# Patient Record
Sex: MALE | Race: WHITE | NOT HISPANIC OR LATINO
[De-identification: names, ages, dates, MRNs, and addresses within clinical notes are randomized per-mention and may not be internally consistent; named-entity substitution may affect disease eponyms.]

---

## 2019-03-28 ENCOUNTER — APPOINTMENT (OUTPATIENT)
Dept: ORTHOPEDIC SURGERY | Facility: CLINIC | Age: 64
End: 2019-03-28
Payer: COMMERCIAL

## 2019-03-28 VITALS
DIASTOLIC BLOOD PRESSURE: 90 MMHG | HEIGHT: 72 IN | BODY MASS INDEX: 29.12 KG/M2 | WEIGHT: 215 LBS | SYSTOLIC BLOOD PRESSURE: 138 MMHG | HEART RATE: 93 BPM

## 2019-03-28 DIAGNOSIS — Z78.9 OTHER SPECIFIED HEALTH STATUS: ICD-10-CM

## 2019-03-28 DIAGNOSIS — Z80.9 FAMILY HISTORY OF MALIGNANT NEOPLASM, UNSPECIFIED: ICD-10-CM

## 2019-03-28 DIAGNOSIS — F41.9 ANXIETY DISORDER, UNSPECIFIED: ICD-10-CM

## 2019-03-28 DIAGNOSIS — Z86.39 PERSONAL HISTORY OF OTHER ENDOCRINE, NUTRITIONAL AND METABOLIC DISEASE: ICD-10-CM

## 2019-03-28 DIAGNOSIS — M47.812 SPONDYLOSIS W/OUT MYELOPATHY OR RADICULOPATHY, CERVICAL REGION: ICD-10-CM

## 2019-03-28 DIAGNOSIS — Z86.718 PERSONAL HISTORY OF OTHER VENOUS THROMBOSIS AND EMBOLISM: ICD-10-CM

## 2019-03-28 DIAGNOSIS — Z86.79 PERSONAL HISTORY OF OTHER DISEASES OF THE CIRCULATORY SYSTEM: ICD-10-CM

## 2019-03-28 PROCEDURE — 99204 OFFICE O/P NEW MOD 45 MIN: CPT

## 2019-03-28 PROCEDURE — 72040 X-RAY EXAM NECK SPINE 2-3 VW: CPT

## 2019-03-28 NOTE — DISCUSSION/SUMMARY
[de-identified] : I have recommended that the pt continue with a conservative treatment plan. Patient is not a candidate for anti-inflammatory medications given his cardiac history. We did discuss cutting down on his steroid dose secondary to cardiac irritability. He is to continue with Valium, as well as soft tissue modalities. He does not want physical modalities, such as PT, as this time. He will be contacted in 1 week to make sure he is progressing well.

## 2019-03-28 NOTE — PHYSICAL EXAM
[de-identified] : CONSTITUTIONAL:  Patient is a very pleasant individual who is well-nourished and appears stated age. \par PSYCHIATRIC:  Alert and oriented times three and in no apparent distress, and participates with orthopedic evaluation well.\par HEAD:  Atraumatic and  nonsyndromic in appearance.\par EENT: No thyromegaly, EOMI.\par RESPIRATORY:  Respiratory rate is regular, not dyspneic on examination.\par LYMPHATICS:  There is no cervical or axillary lymphadenopathy.\par INTEGUMENTARY:  Skin is clean, dry, and intact about the bilateral upper extremities and cervical spine. \par VASCULAR:   There is brisk capillary refill about the bilateral upper extremities and radial pulses are 2/4. \par NEUROLOGIC:  Negative L'hirmitte, negative Spurling’s sign. There are no pathologic reflexes. There is no decrease in sensation of the bilateral upper extremities on Wartenberg pinwheel examination.  Deep tendon reflexes are well-maintained at +2/4 of the bilateral upper extremities and are symmetric.\par MUSCULOSKELETAL:  There is no visible muscular atrophy.  Manual motor strength is well maintained in the bilateral upper extremities.  Cervical range of motion is well maintained. The patient ambulates in a non-myelopathic manner. Normal secondary orthopaedic exam of bilateral shoulders, elbows and hands.  Elbow flexion and extension, wrist extension, finger flexion and abduction are well maintained.\par  \par isolated right trapezial myositis, a well as greater occipital neuritis [de-identified] : Xray of the cervical spine taken today shows shows age appropriate spondylosis

## 2019-03-28 NOTE — HISTORY OF PRESENT ILLNESS
[de-identified] : 63 year old male presents for acute onset cervical spine pain. He states on 3 days ago he fell asleep on a couch, and woke with significant neck pain. Patient states his neck pain has been getting progressively worse since then. He states his pain is sharp in nature with associated neck stiffness. Patient reports no radicular signs and symptoms. He states his pain was mildly improved with use of Valium and topical anti-inflammatory medications. Patient has an extensive cardiac history of including Afib, HTN, HLD, and blood clots, and is currently on Xarelto. He began a Medrol Dose Pack yesterday, and states he became tachycardic. Patient would like to discuss his symptoms at this time.  MAI questionnaire is negative.  [Ataxia] : no ataxia [Incontinence] : no incontinence [Loss of Dexterity] : good dexterity [Urinary Ret.] : no urinary retention

## 2019-03-28 NOTE — ADDENDUM
[FreeTextEntry1] : Documented by Asmita Miller acting as a scribe for Dr. Ned Rico on 03/28/2019. All medical record entries made by the Scribe were at my, Dr. Ned Rico, direction and personally dictated by me on 03/28/2019. I have reviewed the chart and agree that the record accurately reflects my personal performance of the history, physical exam, assessment and plan. I have also personally directed, reviewed, and agreed with the chart.

## 2021-04-23 ENCOUNTER — EMERGENCY (EMERGENCY)
Facility: HOSPITAL | Age: 66
LOS: 1 days | Discharge: HOME | End: 2021-04-23
Attending: EMERGENCY MEDICINE
Payer: COMMERCIAL

## 2021-04-23 ENCOUNTER — TRANSCRIPTION ENCOUNTER (OUTPATIENT)
Age: 66
End: 2021-04-23

## 2021-04-23 VITALS
TEMPERATURE: 99 F | HEART RATE: 74 BPM | RESPIRATION RATE: 18 BRPM | OXYGEN SATURATION: 97 % | DIASTOLIC BLOOD PRESSURE: 97 MMHG | WEIGHT: 205.03 LBS | HEIGHT: 72 IN | SYSTOLIC BLOOD PRESSURE: 139 MMHG

## 2021-04-23 VITALS
OXYGEN SATURATION: 97 % | DIASTOLIC BLOOD PRESSURE: 88 MMHG | TEMPERATURE: 98 F | SYSTOLIC BLOOD PRESSURE: 125 MMHG | HEART RATE: 62 BPM | RESPIRATION RATE: 18 BRPM

## 2021-04-23 DIAGNOSIS — Z98.89 OTHER SPECIFIED POSTPROCEDURAL STATES: Chronic | ICD-10-CM

## 2021-04-23 LAB
ALBUMIN SERPL ELPH-MCNC: 4.4 G/DL — SIGNIFICANT CHANGE UP (ref 3.3–5.2)
ALP SERPL-CCNC: 93 U/L — SIGNIFICANT CHANGE UP (ref 40–120)
ALT FLD-CCNC: 26 U/L — SIGNIFICANT CHANGE UP
ANION GAP SERPL CALC-SCNC: 10 MMOL/L — SIGNIFICANT CHANGE UP (ref 5–17)
APTT BLD: 40.5 SEC — HIGH (ref 27.5–35.5)
AST SERPL-CCNC: 24 U/L — SIGNIFICANT CHANGE UP
BASOPHILS # BLD AUTO: 0.04 K/UL — SIGNIFICANT CHANGE UP (ref 0–0.2)
BASOPHILS NFR BLD AUTO: 0.3 % — SIGNIFICANT CHANGE UP (ref 0–2)
BILIRUB SERPL-MCNC: 0.6 MG/DL — SIGNIFICANT CHANGE UP (ref 0.4–2)
BLD GP AB SCN SERPL QL: SIGNIFICANT CHANGE UP
BUN SERPL-MCNC: 12 MG/DL — SIGNIFICANT CHANGE UP (ref 8–20)
CALCIUM SERPL-MCNC: 9.5 MG/DL — SIGNIFICANT CHANGE UP (ref 8.6–10.2)
CHLORIDE SERPL-SCNC: 107 MMOL/L — SIGNIFICANT CHANGE UP (ref 98–107)
CO2 SERPL-SCNC: 27 MMOL/L — SIGNIFICANT CHANGE UP (ref 22–29)
CREAT SERPL-MCNC: 1 MG/DL — SIGNIFICANT CHANGE UP (ref 0.5–1.3)
EOSINOPHIL # BLD AUTO: 0.14 K/UL — SIGNIFICANT CHANGE UP (ref 0–0.5)
EOSINOPHIL NFR BLD AUTO: 1.2 % — SIGNIFICANT CHANGE UP (ref 0–6)
GLUCOSE SERPL-MCNC: 96 MG/DL — SIGNIFICANT CHANGE UP (ref 70–99)
HCT VFR BLD CALC: 43.1 % — SIGNIFICANT CHANGE UP (ref 39–50)
HCT VFR BLD CALC: 43.4 % — SIGNIFICANT CHANGE UP (ref 39–50)
HGB BLD-MCNC: 14.5 G/DL — SIGNIFICANT CHANGE UP (ref 13–17)
HGB BLD-MCNC: 14.8 G/DL — SIGNIFICANT CHANGE UP (ref 13–17)
IMM GRANULOCYTES NFR BLD AUTO: 0.3 % — SIGNIFICANT CHANGE UP (ref 0–1.5)
INR BLD: 1.29 RATIO — HIGH (ref 0.88–1.16)
LACTATE BLDV-MCNC: 1.4 MMOL/L — SIGNIFICANT CHANGE UP (ref 0.5–2)
LYMPHOCYTES # BLD AUTO: 2.73 K/UL — SIGNIFICANT CHANGE UP (ref 1–3.3)
LYMPHOCYTES # BLD AUTO: 23 % — SIGNIFICANT CHANGE UP (ref 13–44)
MCHC RBC-ENTMCNC: 29.8 PG — SIGNIFICANT CHANGE UP (ref 27–34)
MCHC RBC-ENTMCNC: 30.2 PG — SIGNIFICANT CHANGE UP (ref 27–34)
MCHC RBC-ENTMCNC: 33.4 GM/DL — SIGNIFICANT CHANGE UP (ref 32–36)
MCHC RBC-ENTMCNC: 34.3 GM/DL — SIGNIFICANT CHANGE UP (ref 32–36)
MCV RBC AUTO: 88 FL — SIGNIFICANT CHANGE UP (ref 80–100)
MCV RBC AUTO: 89.1 FL — SIGNIFICANT CHANGE UP (ref 80–100)
MONOCYTES # BLD AUTO: 0.69 K/UL — SIGNIFICANT CHANGE UP (ref 0–0.9)
MONOCYTES NFR BLD AUTO: 5.8 % — SIGNIFICANT CHANGE UP (ref 2–14)
NEUTROPHILS # BLD AUTO: 8.24 K/UL — HIGH (ref 1.8–7.4)
NEUTROPHILS NFR BLD AUTO: 69.4 % — SIGNIFICANT CHANGE UP (ref 43–77)
PLATELET # BLD AUTO: 235 K/UL — SIGNIFICANT CHANGE UP (ref 150–400)
PLATELET # BLD AUTO: 245 K/UL — SIGNIFICANT CHANGE UP (ref 150–400)
POTASSIUM SERPL-MCNC: 4 MMOL/L — SIGNIFICANT CHANGE UP (ref 3.5–5.3)
POTASSIUM SERPL-SCNC: 4 MMOL/L — SIGNIFICANT CHANGE UP (ref 3.5–5.3)
PROT SERPL-MCNC: 7.3 G/DL — SIGNIFICANT CHANGE UP (ref 6.6–8.7)
PROTHROM AB SERPL-ACNC: 14.8 SEC — HIGH (ref 10.6–13.6)
RBC # BLD: 4.87 M/UL — SIGNIFICANT CHANGE UP (ref 4.2–5.8)
RBC # BLD: 4.9 M/UL — SIGNIFICANT CHANGE UP (ref 4.2–5.8)
RBC # FLD: 13.2 % — SIGNIFICANT CHANGE UP (ref 10.3–14.5)
RBC # FLD: 13.3 % — SIGNIFICANT CHANGE UP (ref 10.3–14.5)
SODIUM SERPL-SCNC: 144 MMOL/L — SIGNIFICANT CHANGE UP (ref 135–145)
WBC # BLD: 11.8 K/UL — HIGH (ref 3.8–10.5)
WBC # BLD: 11.88 K/UL — HIGH (ref 3.8–10.5)
WBC # FLD AUTO: 11.8 K/UL — HIGH (ref 3.8–10.5)
WBC # FLD AUTO: 11.88 K/UL — HIGH (ref 3.8–10.5)

## 2021-04-23 PROCEDURE — 85610 PROTHROMBIN TIME: CPT

## 2021-04-23 PROCEDURE — 86900 BLOOD TYPING SEROLOGIC ABO: CPT

## 2021-04-23 PROCEDURE — 96374 THER/PROPH/DIAG INJ IV PUSH: CPT | Mod: XU

## 2021-04-23 PROCEDURE — 85025 COMPLETE CBC W/AUTO DIFF WBC: CPT

## 2021-04-23 PROCEDURE — 80053 COMPREHEN METABOLIC PANEL: CPT

## 2021-04-23 PROCEDURE — 85730 THROMBOPLASTIN TIME PARTIAL: CPT

## 2021-04-23 PROCEDURE — 85027 COMPLETE CBC AUTOMATED: CPT

## 2021-04-23 PROCEDURE — 93010 ELECTROCARDIOGRAM REPORT: CPT

## 2021-04-23 PROCEDURE — G1004: CPT

## 2021-04-23 PROCEDURE — 93005 ELECTROCARDIOGRAM TRACING: CPT

## 2021-04-23 PROCEDURE — 82272 OCCULT BLD FECES 1-3 TESTS: CPT

## 2021-04-23 PROCEDURE — 86901 BLOOD TYPING SEROLOGIC RH(D): CPT

## 2021-04-23 PROCEDURE — 74177 CT ABD & PELVIS W/CONTRAST: CPT | Mod: 26,MG

## 2021-04-23 PROCEDURE — 96375 TX/PRO/DX INJ NEW DRUG ADDON: CPT

## 2021-04-23 PROCEDURE — 70450 CT HEAD/BRAIN W/O DYE: CPT | Mod: 26,MG

## 2021-04-23 PROCEDURE — 99284 EMERGENCY DEPT VISIT MOD MDM: CPT | Mod: 25

## 2021-04-23 PROCEDURE — 99285 EMERGENCY DEPT VISIT HI MDM: CPT

## 2021-04-23 PROCEDURE — 83605 ASSAY OF LACTIC ACID: CPT

## 2021-04-23 PROCEDURE — 36415 COLL VENOUS BLD VENIPUNCTURE: CPT

## 2021-04-23 PROCEDURE — 86850 RBC ANTIBODY SCREEN: CPT

## 2021-04-23 PROCEDURE — 70450 CT HEAD/BRAIN W/O DYE: CPT

## 2021-04-23 PROCEDURE — 74177 CT ABD & PELVIS W/CONTRAST: CPT

## 2021-04-23 RX ORDER — METOCLOPRAMIDE HCL 10 MG
10 TABLET ORAL ONCE
Refills: 0 | Status: COMPLETED | OUTPATIENT
Start: 2021-04-23 | End: 2021-04-23

## 2021-04-23 RX ORDER — PANTOPRAZOLE SODIUM 20 MG/1
40 TABLET, DELAYED RELEASE ORAL ONCE
Refills: 0 | Status: COMPLETED | OUTPATIENT
Start: 2021-04-23 | End: 2021-04-23

## 2021-04-23 RX ORDER — MECLIZINE HCL 12.5 MG
25 TABLET ORAL ONCE
Refills: 0 | Status: COMPLETED | OUTPATIENT
Start: 2021-04-23 | End: 2021-04-23

## 2021-04-23 RX ADMIN — Medication 25 MILLIGRAM(S): at 19:03

## 2021-04-23 RX ADMIN — Medication 10 MILLIGRAM(S): at 15:37

## 2021-04-23 RX ADMIN — PANTOPRAZOLE SODIUM 40 MILLIGRAM(S): 20 TABLET, DELAYED RELEASE ORAL at 15:35

## 2021-04-23 RX ADMIN — Medication 25 MILLIGRAM(S): at 15:38

## 2021-04-23 NOTE — ED ADULT NURSE NOTE - NSIMPLEMENTINTERV_GEN_ALL_ED
Implemented All Universal Safety Interventions:  [place] to call system. Call bell, personal items and telephone within reach. Instruct patient to call for assistance. Room bathroom lighting operational. Non-slip footwear when patient is off stretcher. Physically safe environment: no spills, clutter or unnecessary equipment. Stretcher in lowest position, wheels locked, appropriate side rails in place.

## 2021-04-23 NOTE — ED ADULT TRIAGE NOTE - CHIEF COMPLAINT QUOTE
c/o nausea vomiting, having dark brown vomit, on Xarelto, denies abdominal pain  sent by MD for GI bleed, denies noticing rectal bleeding or dark stool  last month 3 episodes, had episode last night

## 2021-04-23 NOTE — ED ADULT NURSE REASSESSMENT NOTE - NS ED NURSE REASSESS COMMENT FT1
Received report from MONTRELL Goff. CASSANDRA noted, respirations even and nonlabored. No complaints at this time.

## 2021-04-23 NOTE — ED PROVIDER NOTE - NSFOLLOWUPINSTRUCTIONS_ED_ALL_ED_FT
-Your hemoglobin was normal on 2 blood tests which were done 6 hours apart   -You have some cysts on your kidney that should be further evaluated with a nephrologist  -It is also important that you follow up with a GI physician -Your hemoglobin was normal on 2 blood tests which were done 6 hours apart   -You have some cysts on your kidney that should be further evaluated with a nephrologist. Please see a nephrologist to have an Ultrasound of your kidneys.   -It is also important that you follow up with a GI physician to discuss the vomiting you experienced   -Please return if you have any new, worse, or concerning symptoms      Hematemesis    WHAT YOU NEED TO KNOW:    Hematemesis is the vomiting of blood. This is caused by bleeding in your upper gastrointestinal (GI) system. The blood may be bright red, or it may look like coffee grounds. Hematemesis is a medical emergency that needs immediate treatment. You may need to stay in the emergency department for up to 12 hours after treatment. You may then be sent home, or you may be admitted to the hospital for more treatment. If you are sent home, it is important for you to follow up with your healthcare provider as directed.    DISCHARGE INSTRUCTIONS:    Call 911 for any of the following:   •You have signs of shock from blood loss, such as the following: ?Feeling dizzy or faint, or breathing faster than usual      ?Pale, cool, clammy skin  ?A fast pulse, large pupils, or feeling anxious or agitated  ?Nausea or weakness    Return to the emergency department if:   •You are vomiting large amounts of blood, or you vomit several times in a row.  •You have severe pain in your abdomen.     Contact your healthcare provider if:   •You have new or worsening symptoms.   •You have questions or concerns about your condition or care.    Medicines: You may need any of the following:   •Medicine may be given to reduce the amount of acid your stomach produces. This may help if your hematemesis is caused by an ulcer.     •Take your medicine as directed. Contact your healthcare provider if you think your medicine is not helping or if you have side effects. Tell him of her if you are allergic to any medicine. Keep a list of the medicines, vitamins, and herbs you take. Include the amounts, and when and why you take them. Bring the list or the pill bottles to follow-up visits. Carry your medicine list with you in case of an emergency.    Manage your symptoms:   •Do not take NSAIDs or aspirin. These medicines can cause stomach bleeding. Talk to your healthcare provider about other medicines that are safe for you to take.    •Do not smoke. Nicotine can damage blood vessels. Talk to your healthcare provider if you need help quitting. E-cigarettes or smokeless tobacco still contain nicotine. Ask your healthcare provider for information before you use these products.     •Do not drink alcohol or caffeine. Alcohol and caffeine can irritate your stomach. The lining of your stomach or intestine may also be damaged. Talk to your healthcare provider if you need help to quit drinking alcohol.    •Eat a variety of healthy foods. Healthy foods include fruits, vegetables, low-fat dairy products, lean meats, fish, and legumes such as lentils. Healthy foods can help you heal and improve your energy.    •Drink extra liquids as directed. You may need to drink extra liquids to prevent dehydration from vomiting. Ask your healthcare provider how much liquid to drink each day and which liquids are best for you.    Follow up with your healthcare provider as directed: Write down your questions so you remember to ask them during your visits.

## 2021-04-23 NOTE — ED PROVIDER NOTE - NS ED ROS FT
Constitutional: no fever, no chills  Head: NC, AT   Eyes: no redness   ENMT: no nasal congestion/drainage, no sore throat   CV: no chest pain, no edema  Resp: no cough, no dyspnea  GI: no abdominal pain, +nausea, +bloody vomiting, no diarrhea  : no dysuria, no hematuria   Skin: no lesions, no rashes   Neuro: +dizziness, no LOC, no headache, no sensory deficits, no weakness

## 2021-04-23 NOTE — ED PROVIDER NOTE - PATIENT PORTAL LINK FT
You can access the FollowMyHealth Patient Portal offered by [facility] by registering at the following website: [email or web address]. By joining [facility]’s FollowMyHealth portal, you will also be able to view your health information using other applications (apps) compatible with our system.

## 2021-04-23 NOTE — ED PROVIDER NOTE - ATTENDING CONTRIBUTION TO CARE
I, Kodi Montes, performed a face to face bedside interview with this patient regarding history of present illness, review of symptoms and relevant past medical, social and family history.  I completed an independent physical examination. I have communicated the patient’s plan of care and disposition with the resident.  65 year old male with PMH afib on xarelto presents with dizziness and vomiting. Pt states that he has had several weeks of intermittent room spinning dizziness that is worsened with positional changes. He reports that today he had 2 episodes of vomiting, one of which was reddish-brown colored, no galina blood. He denies melena, black or bloody stools, abd pain, chest pain, blurry vision.  Gen: NAD, well appearing  CV: RRR  Pul: CTA b/l  Abd: Soft, non-distended, non-tender  Neuro: MS 5/5 b/l UE and LE, no dysmetria on finger to nose, no focal sensory deficits  Pt improved, states dizziness is better. He has occult blood negative, which if he had upper gi bleed we would suspect to be positive. No episodes of emesis entire time here and rpt hgb completely stable, unlikely to have upper GI bleed. stable for dc

## 2021-04-23 NOTE — ED PROVIDER NOTE - CARE PROVIDER_API CALL
Axel Norman)  Gastroenterology; Internal Medicine  [address], [address]  [place] [address]  Phone: [phone]  Fax: [phone]  Follow Up Time:     Don Pardo)  Internal Medicine; Nephrology  [address], [address]  [place], [place] [id number]  Phone: [phone]  Fax: [phone]  Follow Up Time:

## 2021-04-23 NOTE — ED PROVIDER NOTE - OBJECTIVE STATEMENT
65 year old male with history of afib (on xarelto, s/p ablations, has loop recorder), who presents with nausea. Over the last 65 year old male with history of afib (on xarelto, s/p ablations, has loop recorder) and RVH who presents with nausea. Last night the patient had an emesis of hematemesis. This morning when he woke up it happened again at 09:30 and 10:00. He reports accompanying nausea. States for the last month he has had intermittent dizziness. No abdominal pain, fevers, chills, chest pain, shortness of breath, dysuria, or hematuria. No history of GI bleed. Admits he took NSAIDS in January frequently but not since. No etoh and no history of hpylori. 65 year old male with history of afib (on xarelto, s/p ablations, has loop recorder) and RVH who presents with nausea. Last night the patient had an emesis of hematemesis. This morning when he woke up it happened again at 09:30 and 10:00. He reports accompanying nausea. States for the last month he has had intermittent dizziness. No abdominal pain, fevers, chills, chest pain, shortness of breath, dysuria, or hematuria. No history of GI bleed. Admits he took NSAIDS in January frequently but not since. 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

## 2021-04-23 NOTE — ED PROVIDER NOTE - PROGRESS NOTE DETAILS
Rickie: I discussed occult stool with lab. CT pending. Rickie: I rechecked the patient and updated him on CT results. Patient seen ambulating independently without difficulty. repeat cbc ordered for 2000 Rickie: I Reviewed patient's new CBC. Patient will be discharged home. Will discuss importance of f/u with jackieo + GI

## 2021-04-23 NOTE — ED PROVIDER NOTE - CLINICAL SUMMARY MEDICAL DECISION MAKING FREE TEXT BOX
65 year old male with history of afib (on xarelto, s/p 4 ablations, has loop recorder) and RVH who presented with nausea in setting of hematemesis x 3. Exam unremarkable. Hemoglobin on initial CBC of 14.8, repeat hgb ***. Occult stool negative. CT showed scattered small diverticula without diverticula and renal cysts. Patient also complained of 1 month of intermittent dizziness which was treated symptomatically with meclizine and reglan. CT head negative. Pt will be discharged home with GI and nephrology follow up. 65 year old male with history of afib (on xarelto, s/p 4 ablations, has loop recorder) and RVH who presented with nausea in setting of hematemesis x 3. Exam unremarkable. Hemoglobin on initial CBC of 14.8, repeat hgb 14.5. Occult stool negative. CT showed scattered small diverticula without diverticula and renal cysts. Patient also complained of 1 month of intermittent dizziness which was treated symptomatically with meclizine and reglan. CT head negative. Pt will be discharged home with GI and nephrology follow up.

## 2021-04-23 NOTE — ED ADULT NURSE NOTE - OBJECTIVE STATEMENT
pt reports "spells of nausea and dizziness" that come on suddenly for a month. reports today he vomited and noticed it was black. pt denies any pain. denies dizziness at time of assessment. reports only nausea. a and o x3. sitting calm in bed. breathing even and unlabored. will continue to monitor.

## 2021-04-24 ENCOUNTER — EMERGENCY (EMERGENCY)
Facility: HOSPITAL | Age: 66
LOS: 1 days | Discharge: HOME | End: 2021-04-24
Attending: EMERGENCY MEDICINE
Payer: COMMERCIAL

## 2021-04-24 VITALS
OXYGEN SATURATION: 97 % | SYSTOLIC BLOOD PRESSURE: 133 MMHG | DIASTOLIC BLOOD PRESSURE: 83 MMHG | HEART RATE: 64 BPM | RESPIRATION RATE: 18 BRPM | TEMPERATURE: 98 F | HEIGHT: 72 IN

## 2021-04-24 DIAGNOSIS — Z98.89 OTHER SPECIFIED POSTPROCEDURAL STATES: Chronic | ICD-10-CM

## 2021-04-24 LAB
ABO RH CONFIRMATION: SIGNIFICANT CHANGE UP
ALBUMIN SERPL ELPH-MCNC: 4.1 G/DL — SIGNIFICANT CHANGE UP (ref 3.3–5.2)
ALP SERPL-CCNC: 87 U/L — SIGNIFICANT CHANGE UP (ref 40–120)
ALT FLD-CCNC: 20 U/L — SIGNIFICANT CHANGE UP
ANION GAP SERPL CALC-SCNC: 10 MMOL/L — SIGNIFICANT CHANGE UP (ref 5–17)
APTT BLD: 40.1 SEC — HIGH (ref 27.5–35.5)
AST SERPL-CCNC: 19 U/L — SIGNIFICANT CHANGE UP
BASOPHILS # BLD AUTO: 0.05 K/UL — SIGNIFICANT CHANGE UP (ref 0–0.2)
BASOPHILS NFR BLD AUTO: 0.5 % — SIGNIFICANT CHANGE UP (ref 0–2)
BILIRUB SERPL-MCNC: 0.6 MG/DL — SIGNIFICANT CHANGE UP (ref 0.4–2)
BUN SERPL-MCNC: 11 MG/DL — SIGNIFICANT CHANGE UP (ref 8–20)
CALCIUM SERPL-MCNC: 9.3 MG/DL — SIGNIFICANT CHANGE UP (ref 8.6–10.2)
CHLORIDE SERPL-SCNC: 105 MMOL/L — SIGNIFICANT CHANGE UP (ref 98–107)
CO2 SERPL-SCNC: 27 MMOL/L — SIGNIFICANT CHANGE UP (ref 22–29)
CREAT SERPL-MCNC: 1.07 MG/DL — SIGNIFICANT CHANGE UP (ref 0.5–1.3)
EOSINOPHIL # BLD AUTO: 0.22 K/UL — SIGNIFICANT CHANGE UP (ref 0–0.5)
EOSINOPHIL NFR BLD AUTO: 2.2 % — SIGNIFICANT CHANGE UP (ref 0–6)
GLUCOSE SERPL-MCNC: 84 MG/DL — SIGNIFICANT CHANGE UP (ref 70–99)
HCT VFR BLD CALC: 39.6 % — SIGNIFICANT CHANGE UP (ref 39–50)
HGB BLD-MCNC: 13.8 G/DL — SIGNIFICANT CHANGE UP (ref 13–17)
IMM GRANULOCYTES NFR BLD AUTO: 0.2 % — SIGNIFICANT CHANGE UP (ref 0–1.5)
INR BLD: 1.47 RATIO — HIGH (ref 0.88–1.16)
LYMPHOCYTES # BLD AUTO: 2.91 K/UL — SIGNIFICANT CHANGE UP (ref 1–3.3)
LYMPHOCYTES # BLD AUTO: 29.7 % — SIGNIFICANT CHANGE UP (ref 13–44)
MCHC RBC-ENTMCNC: 30.7 PG — SIGNIFICANT CHANGE UP (ref 27–34)
MCHC RBC-ENTMCNC: 34.8 GM/DL — SIGNIFICANT CHANGE UP (ref 32–36)
MCV RBC AUTO: 88 FL — SIGNIFICANT CHANGE UP (ref 80–100)
MONOCYTES # BLD AUTO: 0.82 K/UL — SIGNIFICANT CHANGE UP (ref 0–0.9)
MONOCYTES NFR BLD AUTO: 8.4 % — SIGNIFICANT CHANGE UP (ref 2–14)
NEUTROPHILS # BLD AUTO: 5.77 K/UL — SIGNIFICANT CHANGE UP (ref 1.8–7.4)
NEUTROPHILS NFR BLD AUTO: 59 % — SIGNIFICANT CHANGE UP (ref 43–77)
PLATELET # BLD AUTO: 224 K/UL — SIGNIFICANT CHANGE UP (ref 150–400)
POTASSIUM SERPL-MCNC: 4.1 MMOL/L — SIGNIFICANT CHANGE UP (ref 3.5–5.3)
POTASSIUM SERPL-SCNC: 4.1 MMOL/L — SIGNIFICANT CHANGE UP (ref 3.5–5.3)
PROT SERPL-MCNC: 6.9 G/DL — SIGNIFICANT CHANGE UP (ref 6.6–8.7)
PROTHROM AB SERPL-ACNC: 16.7 SEC — HIGH (ref 10.6–13.6)
RBC # BLD: 4.5 M/UL — SIGNIFICANT CHANGE UP (ref 4.2–5.8)
RBC # FLD: 13.2 % — SIGNIFICANT CHANGE UP (ref 10.3–14.5)
SODIUM SERPL-SCNC: 142 MMOL/L — SIGNIFICANT CHANGE UP (ref 135–145)
TROPONIN T SERPL-MCNC: <0.01 NG/ML — SIGNIFICANT CHANGE UP (ref 0–0.06)
WBC # BLD: 9.79 K/UL — SIGNIFICANT CHANGE UP (ref 3.8–10.5)
WBC # FLD AUTO: 9.79 K/UL — SIGNIFICANT CHANGE UP (ref 3.8–10.5)

## 2021-04-24 PROCEDURE — 99220: CPT

## 2021-04-24 PROCEDURE — G1004: CPT

## 2021-04-24 PROCEDURE — 70450 CT HEAD/BRAIN W/O DYE: CPT | Mod: 26,ME

## 2021-04-24 RX ORDER — MECLIZINE HCL 12.5 MG
25 TABLET ORAL EVERY 8 HOURS
Refills: 0 | Status: DISCONTINUED | OUTPATIENT
Start: 2021-04-24 | End: 2021-04-29

## 2021-04-24 RX ORDER — NIFEDIPINE 30 MG
30 TABLET, EXTENDED RELEASE 24 HR ORAL DAILY
Refills: 0 | Status: DISCONTINUED | OUTPATIENT
Start: 2021-04-24 | End: 2021-04-29

## 2021-04-24 RX ORDER — ACETAMINOPHEN 500 MG
975 TABLET ORAL ONCE
Refills: 0 | Status: COMPLETED | OUTPATIENT
Start: 2021-04-24 | End: 2021-04-24

## 2021-04-24 RX ORDER — RIVAROXABAN 15 MG-20MG
20 KIT ORAL
Refills: 0 | Status: DISCONTINUED | OUTPATIENT
Start: 2021-04-24 | End: 2021-04-29

## 2021-04-24 RX ORDER — MECLIZINE HCL 12.5 MG
25 TABLET ORAL ONCE
Refills: 0 | Status: COMPLETED | OUTPATIENT
Start: 2021-04-24 | End: 2021-04-24

## 2021-04-24 RX ORDER — LISINOPRIL 2.5 MG/1
5 TABLET ORAL DAILY
Refills: 0 | Status: DISCONTINUED | OUTPATIENT
Start: 2021-04-24 | End: 2021-04-29

## 2021-04-24 RX ORDER — DOCUSATE SODIUM 100 MG
100 CAPSULE ORAL EVERY 8 HOURS
Refills: 0 | Status: DISCONTINUED | OUTPATIENT
Start: 2021-04-24 | End: 2021-04-29

## 2021-04-24 RX ORDER — METOCLOPRAMIDE HCL 10 MG
10 TABLET ORAL ONCE
Refills: 0 | Status: COMPLETED | OUTPATIENT
Start: 2021-04-24 | End: 2021-04-24

## 2021-04-24 RX ORDER — DISOPYRAMIDE PHOSPHATE 100 MG
150 CAPSULE ORAL THREE TIMES A DAY
Refills: 0 | Status: DISCONTINUED | OUTPATIENT
Start: 2021-04-24 | End: 2021-04-29

## 2021-04-24 RX ORDER — ATORVASTATIN CALCIUM 80 MG/1
40 TABLET, FILM COATED ORAL AT BEDTIME
Refills: 0 | Status: DISCONTINUED | OUTPATIENT
Start: 2021-04-24 | End: 2021-04-29

## 2021-04-24 RX ORDER — ONDANSETRON 8 MG/1
4 TABLET, FILM COATED ORAL ONCE
Refills: 0 | Status: COMPLETED | OUTPATIENT
Start: 2021-04-24 | End: 2021-04-25

## 2021-04-24 RX ADMIN — LISINOPRIL 5 MILLIGRAM(S): 2.5 TABLET ORAL at 22:10

## 2021-04-24 RX ADMIN — Medication 25 MILLIGRAM(S): at 22:09

## 2021-04-24 RX ADMIN — Medication 100 MILLIGRAM(S): at 22:10

## 2021-04-24 RX ADMIN — ATORVASTATIN CALCIUM 40 MILLIGRAM(S): 80 TABLET, FILM COATED ORAL at 22:10

## 2021-04-24 RX ADMIN — Medication 150 MILLIGRAM(S): at 22:10

## 2021-04-24 RX ADMIN — Medication 25 MILLIGRAM(S): at 19:07

## 2021-04-24 RX ADMIN — Medication 975 MILLIGRAM(S): at 22:09

## 2021-04-24 NOTE — ED PROVIDER NOTE - OBJECTIVE STATEMENT
64yo M w/pmh TIAs (most recent 2y ago), afib s/p ablation x4 (most recent 5y ago), on Xarelto presents for aphasia x1 hour, now resolving, activated as code stroke. Denies dizziness, syncope, weakness, CP, SOB, n/v, abdominal pain, f/ch.

## 2021-04-24 NOTE — ED PROVIDER NOTE - NSFOLLOWUPINSTRUCTIONS_ED_ALL_ED_FT
1. Follow up with neurology within the week.  2. Return to the ED for any new or worsening symptoms, such as weakness, facial droop, numbness/tingling of the arms or legs, severe headache, or difficulty finding or saying words.        Transient Ischemic Attack    WHAT YOU NEED TO KNOW:    A transient ischemic attack (TIA), or mini-stroke, happens when blood cannot flow to part of the brain. A TIA only lasts minutes to hours and does not cause lasting damage. It is still important to get immediate medical care. A TIA may be a warning that you are about to have an ischemic stroke. An ischemic stroke happens when blood flow to the brain is suddenly blocked, usually by a blood clot.      DISCHARGE INSTRUCTIONS:    Call your local emergency number (911 in the [place]) or have someone else call if:   •You have any of the following signs of a stroke: ?Numbness or drooping on one side of your face       ?Weakness in an arm or leg      ?Confusion or difficulty speaking      ?Dizziness, a severe headache, or vision loss    BE FAST SIGNS OF A STROKE         •You have a seizure.      •You have chest pain or shortness of breath.      •You cough up blood.      Seek care immediately if:   •Your arm or leg feels warm, tender, and painful. It may look swollen and red.      •You have unusual or heavy bleeding.      •You have a severe headache or feel dizzy.      Call your doctor or neurologist if:   •Your blood pressure or blood sugar level is higher or lower than you were told it should be.      •You have questions or concerns about your condition or care.      Warning signs of a stroke: The words BE FAST can help you remember and recognize warning signs of a stroke:  •B = Balance: Sudden loss of balance      •E = Eyes: Loss of vision in one or both eyes      •F = Face: Face droops on one side      •A = Arms: Arm drops when both arms are raised      •S = Speech: Speech is slurred or sounds different      •T = Time: Time to get help immediately    BE FAST SIGNS OF A STROKE         Medicines: You may need any of the following:   •Antiplatelets, such as aspirin, help prevent blood clots. Take your antiplatelet medicine exactly as directed. These medicines make it more likely for you to bleed or bruise. If you are told to take aspirin, do not take acetaminophen or ibuprofen instead.       •Blood thinners help prevent blood clots. Clots can cause strokes, heart attacks, and death. The following are general safety guidelines to follow while you are taking a blood thinner:?Watch for bleeding and bruising while you take blood thinners. Watch for bleeding from your gums or nose. Watch for blood in your urine and bowel movements. Use a soft washcloth on your skin, and a soft toothbrush to brush your teeth. This can keep your skin and gums from bleeding. If you shave, use an electric shaver. Do not play contact sports.       ?Tell your dentist and other healthcare providers that you take a blood thinner. Wear a bracelet or necklace that says you take this medicine.       ?Do not start or stop any other medicines unless your healthcare provider tells you to. Many medicines cannot be used with blood thinners.      ?Take your blood thinner exactly as prescribed by your healthcare provider. Do not skip does or take less than prescribed. Tell your provider right away if you forget to take your blood thinner, or if you take too much.      ?Warfarin is a blood thinner that you may need to take. The following are things you should be aware of if you take warfarin: ?Foods and medicines can affect the amount of warfarin in your blood. Do not make major changes to your diet while you take warfarin. Warfarin works best when you eat about the same amount of vitamin K every day. Vitamin K is found in green leafy vegetables and certain other foods. Ask for more information about what to eat when you are taking warfarin.      ?You will need to see your healthcare provider for follow-up visits when you are on warfarin. You will need regular blood tests. These tests are used to decide how much medicine you need.         •Other medicines may be needed to treat diabetes, depression, high cholesterol, or blood pressure problems. You may also need medicine to decrease the pressure in your brain, reduce pain, or prevent seizures.      •Take your medicine as directed. Contact your healthcare provider if you think your medicine is not helping or if you have side effects. Tell him of her if you are allergic to any medicine. Keep a list of the medicines, vitamins, and herbs you take. Include the amounts, and when and why you take them. Bring the list or the pill bottles to follow-up visits. Carry your medicine list with you in case of an emergency.      What you can do to prevent another TIA or a stroke:   •Manage health conditions. A condition such as diabetes can increase your risk for a stroke. Control your blood sugar level if you have hyperglycemia or diabetes. Take your prescribed medicines and check your blood sugar level as directed.      •Check your blood pressure as directed. High blood pressure can increase your risk for a stroke. If you have high blood pressure, follow your healthcare provider’s directions for controlling your blood pressure.       •Do not use nicotine products or illegal drugs. Nicotine and other chemicals in cigarettes and cigars can cause blood vessel damage. Nicotine and illegal drugs both increase your risk for a stroke. Ask your healthcare provider for information if you currently smoke or use drugs and need help to quit. E- cigarettes or smokeless tobacco still contain nicotine. Talk to your healthcare provider before you use these products.      •Talk to your healthcare provider about alcohol. Alcohol can raise your blood pressure. The recommended limit is 2 drinks in a day for men and 1 drink in a day for women. Do not binge drink or save a week's worth of alcohol to drink in 1 or 2 days. Limit weekly amounts as directed by your provider.      •Eat a variety of healthy foods. Healthy foods include whole-grain breads, low-fat dairy products, beans, lean meats, and fish. Eat at least 5 servings of fruits and vegetables each day. Choose foods that are low in fat, cholesterol, salt, and sugar. Eat foods that are high in potassium, such as potatoes and bananas. A dietitian can help you create healthy meal plans.       •Maintain a healthy weight. Ask your healthcare provider how much you should weigh. Ask him or her to help you create a weight loss plan if you are overweight. He or she can help you create small goals if you have a lot of weight to lose.      •Exercise as directed. Exercise can lower your blood pressure, cholesterol, weight, and blood sugar levels. Healthcare providers will help you create exercise goals. They can also help you make a plan to reach your goals. For example, you can break exercise into 10 minute periods, 3 times in the day. Find an exercise that you enjoy. This will make it easier for you to reach your exercise goals.      •Manage stress. Stress can raise your blood pressure. Find ways to relax, such as deep breathing or listening to music.      Follow up with your doctor or neurologist in 1 to 2 days: Write down your questions so you remember to ask them during your visits.

## 2021-04-24 NOTE — ED CDU PROVIDER INITIAL DAY NOTE - MEDICAL DECISION MAKING DETAILS
Initial work up reviewed; patient still complains of transient dizziness; will admit to obs for MRI and neurology consult.

## 2021-04-24 NOTE — ED PROVIDER NOTE - PHYSICAL EXAMINATION
General: well appearing, NAD  Head: NC/AT  Cardiac: RRR, no m/r/g  Respiratory: CTABL, equal chest wall expansions, no conversational dyspnea  Abdomen: soft, ND, NT  Neuro: AAOx3, negative pronator drift, strength 5/5, sensation to light touch intact, finger to nose coordination intact, cranial nerves 2-12 intact  Psych: calm, cooperative, normal affect  Skin: warm and dry

## 2021-04-24 NOTE — ED CDU PROVIDER INITIAL DAY NOTE - OBJECTIVE STATEMENT
64yo M PMHx TIAs (most recent 2y ago), afib s/p ablation x4 (most recent 5y ago), on Xarelto presents for aphasia x1 hour, now resolving, activated as code stroke. Denies dizziness, syncope, weakness, CP, SOB, n/v, abdominal pain, f/ch. 64yo M PMHx TIAs (most recent 2y ago), afib s/p ablation x4 (most recent 5y ago), on Xarelto presents for aphasia x1 hour, now resolving, activated as code stroke. Pt reports over the past 3 weeks he has been having intermittent dizziness associated with nausea and vomiting. States dizziness and nausea have gotten worse over the past few days. Denies off balance sensation. Pt admits to smoking recreational marijuana and states that prior episodes of nausea and dizziness have occurred after smoking.

## 2021-04-24 NOTE — ED PROVIDER NOTE - NS ED ROS FT
Constitutional: no fever, sweats, and no chills.  CV: no chest pain, no edema.  Resp: no cough, no dyspnea  GI: no abdominal pain, no nausea and no vomiting.  : no dysuria, no hematuria  MSK: no msk pain, no weakness  Skin: no lesions, and no rashes.  Neuro: no LOC, no headache, no sensory deficits, and no dizziness, +aphasia  ROS otherwise negative except as noted in HPI.

## 2021-04-24 NOTE — ED PROVIDER NOTE - CARE PROVIDER_API CALL
Rey Duff)  Neurology; Vascular Neurology  [address], [address]  [place] [address]  Phone: [phone]  Fax: [phone]  Follow Up Time:

## 2021-04-24 NOTE — ED PROVIDER NOTE - NSSTROKETPAEXCLREL_OTHER_FT
Patien on anticoagulation on xarelto, all symptoms resolved, spoke to telestroke, no tPA at this time, no significant deficits. Risks outweigh benefit of tPA at this time.

## 2021-04-24 NOTE — ED PROVIDER NOTE - CLINICAL SUMMARY MEDICAL DECISION MAKING FREE TEXT BOX
66yo M w/pmh TIAs (most recent 2y ago), afib s/p ablation x4 (most recent 5y ago), on Xarelto presents for aphasia x1 hour, now resolving, activated as code stroke.

## 2021-04-24 NOTE — ED CDU PROVIDER INITIAL DAY NOTE - DETAILS
65 year old male p/w dizziness and episode of aphasia this afternoon which has resolved. Placed in obs for MRI and neuro consult.

## 2021-04-24 NOTE — ED ADULT NURSE NOTE - OBJECTIVE STATEMENT
Patient had an episode around 1130am of difficulty finding words and forming sentences, for about 45mins. Patient states he has been having dizziness on and off for 1 month pt had 3 sever episodes of dizziness over past month resulting in vomiting one of which was yesterday he came to ED last night because vomit was dark brown, preceding these sever episodes pt had smoked marijuana.

## 2021-04-24 NOTE — ED PROVIDER NOTE - PROGRESS NOTE DETAILS
Spoke to telestroke, Dr. Duff, patient back to baseline and on Xarelto, recs lipitor 40, f/u within the week with his neurologist or with Dr. Duff. Labs unremarkable. Re-assessed patient, he still feels dizzy and headache since yesterday, similar to prior episodes over the past month. Discussed options of MRI inpatient vs. outpatient f/u with patient, he would prefer to stay for MRI here. Discussed negative CT head noncon, Dr. Duff's recommendations. Labs still pending.

## 2021-04-25 VITALS
DIASTOLIC BLOOD PRESSURE: 78 MMHG | RESPIRATION RATE: 18 BRPM | HEART RATE: 54 BPM | SYSTOLIC BLOOD PRESSURE: 152 MMHG | TEMPERATURE: 99 F | OXYGEN SATURATION: 95 %

## 2021-04-25 PROCEDURE — 85610 PROTHROMBIN TIME: CPT

## 2021-04-25 PROCEDURE — 36415 COLL VENOUS BLD VENIPUNCTURE: CPT

## 2021-04-25 PROCEDURE — 99285 EMERGENCY DEPT VISIT HI MDM: CPT | Mod: 25

## 2021-04-25 PROCEDURE — 80053 COMPREHEN METABOLIC PANEL: CPT

## 2021-04-25 PROCEDURE — 82962 GLUCOSE BLOOD TEST: CPT

## 2021-04-25 PROCEDURE — 70551 MRI BRAIN STEM W/O DYE: CPT

## 2021-04-25 PROCEDURE — 96374 THER/PROPH/DIAG INJ IV PUSH: CPT

## 2021-04-25 PROCEDURE — 70551 MRI BRAIN STEM W/O DYE: CPT | Mod: 26,MA

## 2021-04-25 PROCEDURE — 85025 COMPLETE CBC W/AUTO DIFF WBC: CPT

## 2021-04-25 PROCEDURE — 99223 1ST HOSP IP/OBS HIGH 75: CPT

## 2021-04-25 PROCEDURE — 84484 ASSAY OF TROPONIN QUANT: CPT

## 2021-04-25 PROCEDURE — 99226: CPT

## 2021-04-25 PROCEDURE — G0378: CPT

## 2021-04-25 PROCEDURE — 70450 CT HEAD/BRAIN W/O DYE: CPT

## 2021-04-25 PROCEDURE — 85730 THROMBOPLASTIN TIME PARTIAL: CPT

## 2021-04-25 RX ORDER — METOCLOPRAMIDE HCL 10 MG
10 TABLET ORAL EVERY 8 HOURS
Refills: 0 | Status: DISCONTINUED | OUTPATIENT
Start: 2021-04-25 | End: 2021-04-29

## 2021-04-25 RX ORDER — RIVAROXABAN 15 MG-20MG
20 KIT ORAL
Refills: 0 | Status: DISCONTINUED | OUTPATIENT
Start: 2021-04-25 | End: 2021-04-25

## 2021-04-25 RX ADMIN — Medication 100 MILLIGRAM(S): at 05:58

## 2021-04-25 RX ADMIN — Medication 25 MILLIGRAM(S): at 15:15

## 2021-04-25 RX ADMIN — RIVAROXABAN 20 MILLIGRAM(S): KIT at 11:59

## 2021-04-25 RX ADMIN — Medication 150 MILLIGRAM(S): at 05:58

## 2021-04-25 RX ADMIN — Medication 100 MILLIGRAM(S): at 22:13

## 2021-04-25 RX ADMIN — ATORVASTATIN CALCIUM 40 MILLIGRAM(S): 80 TABLET, FILM COATED ORAL at 22:13

## 2021-04-25 RX ADMIN — Medication 30 MILLIGRAM(S): at 05:59

## 2021-04-25 RX ADMIN — Medication 150 MILLIGRAM(S): at 15:15

## 2021-04-25 RX ADMIN — ONDANSETRON 4 MILLIGRAM(S): 8 TABLET, FILM COATED ORAL at 20:07

## 2021-04-25 RX ADMIN — Medication 150 MILLIGRAM(S): at 22:13

## 2021-04-25 RX ADMIN — Medication 100 MILLIGRAM(S): at 15:15

## 2021-04-25 RX ADMIN — LISINOPRIL 5 MILLIGRAM(S): 2.5 TABLET ORAL at 22:13

## 2021-04-25 RX ADMIN — Medication 975 MILLIGRAM(S): at 01:51

## 2021-04-25 RX ADMIN — Medication 10 MILLIGRAM(S): at 21:28

## 2021-04-25 RX ADMIN — Medication 25 MILLIGRAM(S): at 05:58

## 2021-04-25 NOTE — ED CDU PROVIDER SUBSEQUENT DAY NOTE - PROGRESS NOTE DETAILS
Received sign out from CHARLES Ford. No motor/sensory deficits. No facial droop. Normal gait. Added Reglan 10mg PO TID. Patient pending MRI.

## 2021-04-25 NOTE — ED ADULT NURSE REASSESSMENT NOTE - GENERAL PATIENT STATE
comfortable appearance/cooperative
comfortable appearance/cooperative/smiling/interactive

## 2021-04-25 NOTE — CONSULT NOTE ADULT - ASSESSMENT
The patient is a 65y Male who is followed by neurology because of recurrent dizziness    Dizziness  Has both lightheadedness and vertigo components   CT head and symptoms do not suggest central etiology  However given A fib I agree with MRI brain to evaluate for CVA.  Telemetry to evaluate for any other arrhythmias    If brain MRI does not show CVA suggest referral to outpatient vestibular therapy  If brain MRI does show CVA will need admission for stroke work up and cardiology evaluation    discussed with patient and his wife    will follow with you    Juanjo Hercules MD PhD   [id number]

## 2021-04-25 NOTE — CONSULT NOTE ADULT - SUBJECTIVE AND OBJECTIVE BOX
[facility]                                     Neurology at [place]                                 Diomed[address] Shaw, [address] See                                  [address]                                        [place], [place], [address]                                             [phone]    CC: dizziness  HPI  :The patient is a 65y Male who presented with several episodes of severe dizziness, lightheaded and spinning with emesis over past few weeks.  It has been worse this week with several episodes.  He had possible blood in emesis and awoken with blood in coughed up sputum.  He has worsening dizziness with head turn.  No clear otologic symptoms.  He is taking xarelto for a fib.  Neurology is asked to evaluate.    PAST MEDICAL & SURGICAL HISTORY:  Hypertension    Afib    H/O prior ablation treatment    TIA (transient ischemic attack)    H/O prior ablation treatment        MEDICATIONS  (STANDING):  atorvastatin 40 milliGRAM(s) Oral at bedtime  disopyramide 150 milliGRAM(s) Oral three times a day  docusate sodium 100 milliGRAM(s) Oral every 8 hours  lisinopril 5 milliGRAM(s) Oral daily  NIFEdipine XL 30 milliGRAM(s) Oral daily  rivaroxaban 20 milliGRAM(s) Oral with dinner    MEDICATIONS  (PRN):  meclizine 25 milliGRAM(s) Oral every 8 hours PRN Dizziness  ondansetron Injectable 4 milliGRAM(s) IV Push once PRN Nausea and/or Vomiting      Allergies    No Known Allergies    Intolerances        SOCIAL HISTORY:  no tob,   no alcohol   (+) marijuana    FAMILY HISTORY:  Family history of stroke (Mother)          ROS: 14 point ROS negative other than what is present in HPI or below    Vital Signs Last 24 Hrs  T(C): 36.9 (25 Apr 2021 11:46), Max: 36.9 (24 Apr 2021 13:21)  T(F): 98.4 (25 Apr 2021 11:46), Max: 98.5 (24 Apr 2021 13:21)  HR: 48 (25 Apr 2021 11:46) (46 - 64)  BP: 146/85 (25 Apr 2021 11:46) (121/69 - 146/85)  BP(mean): --  RR: 17 (25 Apr 2021 11:46) (16 - 18)  SpO2: 99% (25 Apr 2021 11:46) (97% - 99%)      General: NAD    Detailed Neurologic Exam:    Mental status: The patient is awake and alert and has normal attention span.  The patient is fully oriented in 3 spheres. The patient is oriented to current events. The patient is able to name objects, follow commands, repeat sentences.    Cranial nerves: Pupils equal and react symmetrically to light. There is no visual field deficit to confrontation. Extraocular motion is full with no nystagmus. There is no ptosis. Facial sensation is intact. Facial musculature is symmetric. Palate elevates symmetrically. Tongue is midline.    Motor: There is normal bulk and tone.  There is no tremor.  Strength is 5/5 in the right arm and leg.   Strength is 5/5 in the left arm and leg.    Sensation: Intact to light touch and pin in 4 extremities    Reflexes: 2+ throughout     Cerebellar: There is no dysmetria on finger to nose testing.    Gait : deferred    LABS:                         13.8   9.79  )-----------( 224      ( 24 Apr 2021 14:41 )             39.6       04-24    142  |  105  |  11.0  ----------------------------<  84  4.1   |  27.0  |  1.07    Ca    9.3      24 Apr 2021 14:41    TPro  6.9  /  Alb  4.1  /  TBili  0.6  /  DBili  x   /  AST  19  /  ALT  20  /  AlkPhos  87  04-24      PT/INR - ( 24 Apr 2021 14:41 )   PT: 16.7 sec;   INR: 1.47 ratio         PTT - ( 24 Apr 2021 14:41 )  PTT:40.1 sec    RADIOLOGY & ADDITIONAL STUDIES (independently reviewed unless otherwise noted):  head CT no acute stroke, mass or blood  (+) SVID

## 2021-04-25 NOTE — ED ADULT NURSE REASSESSMENT NOTE - COMFORT CARE
plan of care explained/side rails up/wait time explained/warm blanket provided
ambulated to bathroom/plan of care explained/wait time explained
plan of care explained/repositioned/side rails up
poor balance

## 2021-04-26 PROCEDURE — 99217: CPT

## 2021-04-26 RX ORDER — METOCLOPRAMIDE HCL 10 MG
1 TABLET ORAL
Qty: 9 | Refills: 0
Start: 2021-04-26 | End: 2021-04-28

## 2021-04-26 RX ORDER — MECLIZINE HCL 12.5 MG
1 TABLET ORAL
Qty: 15 | Refills: 0
Start: 2021-04-26 | End: 2021-04-30

## 2021-04-26 NOTE — ED CDU PROVIDER DISPOSITION NOTE - PATIENT PORTAL LINK FT
You can access the FollowMyHealth Patient Portal offered by [facility] by registering at the following website: [email or web address]. By joining [employer]’s FollowMyHealth portal, you will also be able to view your health information using other applications (apps) compatible with our system.

## 2021-04-26 NOTE — ED CDU PROVIDER SUBSEQUENT DAY NOTE - CROS ED NEURO NEG
no headache/no difficulty walking/imbalance/no seizures
no headache/no difficulty walking/imbalance/no seizures

## 2021-04-26 NOTE — ED CDU PROVIDER SUBSEQUENT DAY NOTE - PMH
Afib    H/O prior ablation treatment    Hypertension    TIA (transient ischemic attack)    
Afib    H/O prior ablation treatment    Hypertension    TIA (transient ischemic attack)

## 2021-04-26 NOTE — ED CDU PROVIDER SUBSEQUENT DAY NOTE - MEDICAL DECISION MAKING DETAILS
64 yo male PMHx A-fib (Xarelto), TIA presents to ED dizziness and headache x1 month, intermittently worsening. CT negative, evaluated by neurology, recommended OBS for MRI. MRI negative, per neurology stable for follow up with vestibular clinic.
MRI, neuro consult

## 2021-04-26 NOTE — ED ADULT NURSE REASSESSMENT NOTE - NURSING NEURO LEVEL OF CONSCIOUSNESS
alert and awake/follows commands
alert and awake
alert and awake/follows commands
alert and awake
alert and awake/follows commands

## 2021-04-26 NOTE — ED CDU PROVIDER DISPOSITION NOTE - CLINICAL COURSE
66 yo male PMHx A-fib (Xarelto), TIA presents to ED dizziness and headache x1 month, intermittently worsening. CT negative, evaluated by neurology, recommended OBS for MRI. MRI negative, per neurology stable for follow up with vestibular clinic. SW contacted.

## 2021-04-26 NOTE — ED CDU PROVIDER DISPOSITION NOTE - CARE PROVIDERS DIRECT ADDRESSES
,DirectAddress_Unknown,[email or web address]

## 2021-04-26 NOTE — ED CDU PROVIDER DISPOSITION NOTE - CARE PROVIDER_API CALL
Alber Carpenter)  Otolaryngology  [facility], [address]  [place] [address]  Phone: [phone]  Fax: [phone]  Follow Up Time:     Juanjo Hercules; PhD)  Neurology; Vascular Neurology  [address]  [place] [address]  Phone: [phone]  Fax: [phone]  Follow Up Time:

## 2021-04-26 NOTE — ED CDU PROVIDER SUBSEQUENT DAY NOTE - FAMILY HISTORY
Mother  Still living? Unknown  Family history of stroke, Age at diagnosis: Age Unknown  
Mother  Still living? Unknown  Family history of stroke, Age at diagnosis: Age Unknown

## 2021-04-26 NOTE — ED ADULT NURSE REASSESSMENT NOTE - NS ED NURSE REASSESS COMMENT FT1
Assumed care from previous RN. Pt is A&Ox4, in NAD. Pt presented to ED c/o intermittent dizziness x3 months.Pt states that Friday morning he had a severe episode of dizziness that was followed by an episode of dark brown vomiting. States he came to the ED and was discharged. Pt had another episode of vomiting with a large clot in it this morning and came back. Pt aware of POC and need for observation. Pt is still c/o mild dizziness and headache at this time. Pt reports feeling better after eating a sandwich. Neuro exam WNL. Will continue to monitor.
report received from previous RN.  Pt a&ox4 c/o HA and dizziness, pt to receive tylenol and antivert as per PA order.  NAD noted, respirations even and unlabored.  Safety precautions in place.  Plan of care explained, pt verbalized understanding. To receive MRI.
Asking for food, per Dr Ramon taylor to feed pt and pt given lunch box.
Pt comfortable at this time. C/o dizziness Antivert given as ordered. Awaiting MRI. Safety maintained. Will continue to monitor.
Pt resting comfortably in stretcher, NAD noted, respirations even and unlabored,  safety maintained, VSS.
Pt VSS, no neuro deficits noted Pt with no complaints for RN at this time will continue to monitor.
Assumed care of the patient @0730. Pt A&Ox4. No respiratory distress, VSS afebrile. Patient in understanding of plan of care. Patient with no further questions for the RN. Resting in comfort. Call bell within reach and encouraged to use when assistance needed. Will continue to monitor.
Assumed care of the Pt at 1930. Verbal report received from MONTRELL Erwin. Pt is AOx4 in no acute distress. Pt Denies any chest pain, shortness of breath, nausea or dizziness at this time. Pt NIH 0 at this time no complaints for RN. Pt VSS, PIV patent. NSR on CM as per monitor tech. Pt given call bell, call bell within reach Pt instructed to use call bell when assistance is needed. Pt to be given meds as per orders. Pt pending MRI. Wait time explained, plan of care explained, Pt in understanding of plan of care will continue to monitor.

## 2021-04-26 NOTE — ED CDU PROVIDER DISPOSITION NOTE - NSFOLLOWUPINSTRUCTIONS_ED_ALL_ED_FT
- Prescription sent to pharmacy.  - Social work was contacted to schedule you a follow up with the Vestibular Clinic. You must bring this attached prescription with you.   - You may also follow up with neurology/ENT as outpatient.   - Please bring all documentation from your ED visit to any related future follow up appointment.  - Please call to schedule follow up appointment with your primary care physician within 24-48 hours.  - Please seek immediate medical attention for any new/worsening, signs/symptoms, or concerns.    Feel better!       Dizziness    WHAT YOU NEED TO KNOW:    What is dizziness? Dizziness is a feeling of being off balance or unsteady. Common causes of dizziness are an inner ear fluid imbalance or a lack of oxygen in your blood. Dizziness may be acute (lasts 3 days or less) or chronic (lasts longer than 3 days). You may have dizzy spells that last from seconds to a few hours.     What increases my risk for dizziness? Dizziness may get worse during certain activities or when you move a certain way. The following may also increase your risk for dizziness:   •Older age      •An infection, ear surgery, or an inner ear condition, such as Ménière disease      •Stroke, a brain tumor, or a recent head trauma       •An injury that causes a large amount of blood loss      •Heart or blood pressure problems       •Exposure to chemicals, or long-term alcohol use       •Medicines used to treat high blood pressure, seizure disorders, or anxiety and depression       •A nerve disorder, such as multiple sclerosis      What signs and symptoms may happen with dizziness?   •A feeling that your surroundings are moving even though you are standing still      •Ringing in your ears or hearing loss       •Feeling faint or lightheaded       •Weakness or unsteadiness       •Double vision or eye movements you cannot control      •Nausea or vomiting       •Confusion      How is the cause of dizziness diagnosed? Your healthcare provider may ask when the dizziness started. Tell the provider if you have dizzy spells, and how long they last. Tell him or her what happens before you become dizzy. The provider will ask if you have other health conditions and if you take any medicines. He or she will check your blood pressure and pulse to see if your dizziness may be related to your heart. Your balance, strength, reflexes, and the way you walk may also be checked. You may need any of the following tests to help find the cause of your dizziness:   •An EKG records the electrical activity of your heart. An EKG can be used to check for an abnormal heart beat or heart damage.      •Blood tests will check your blood sugar level, infection, and your blood cell levels.       •CT or MRI pictures check for a stroke, head injury, or brain tumor. They also check for brain bleeding or swelling. You may be given contrast liquid to help your brain show up better in the pictures. Tell the healthcare provider if you have ever had an allergic reaction to contrast liquid. Do not enter the MRI room with anything metal. Metal can cause serious injury. Tell the healthcare provider if you have any metal in or on your body.      How is dizziness treated? Treatment will depend on the cause of your dizziness. Your healthcare provider may give you oxygen or medicines to decrease your dizziness and nausea. He may also refer you to a specialist. You may need to be admitted to the hospital for treatment.    How can I manage my symptoms?   •Do not drive or operate heavy machinery when you are dizzy.       •Get up slowly from sitting or lying down.       •Drink plenty of liquids. Liquids help prevent dehydration. Ask how much liquid to drink each day and which liquids are best for you.      When should I seek immediate care?   •You have a headache and a stiff neck.      •You have shaking chills and a fever.       •You vomit over and over with no relief.       •Your vomit or bowel movements are red or black.       •You have pain in your chest, back, or abdomen.       •You have numbness, especially in your face, arms, or legs.       •You have trouble moving your arms or legs.       •You are confused.       When should I contact my healthcare provider?   •You have a fever.       •Your symptoms do not get better with treatment.       •You have questions or concerns about your condition or care.       CARE AGREEMENT:    You have the right to help plan your care. Learn about your health condition and how it may be treated. Discuss treatment options with your healthcare providers to decide what care you want to receive. You always have the right to refuse treatment.

## 2021-04-26 NOTE — ED CDU PROVIDER SUBSEQUENT DAY NOTE - ATTENDING CONTRIBUTION TO CARE
I, Naty Navarro MD have personally performed a face to face diagnostic evaluation on this patient.  I have reviewed the ACP note and agree with the history, exam, and plan of care, except as noted.     mri neg neuro evaluated ok to dc with vestibular rehab follow up
I, Naty Navarro MD have personally performed a face to face diagnostic evaluation on this patient.  I have reviewed the ACP note and agree with the history, exam, and plan of care, except as noted.     no symptoms at this time no focal neuro deficit NIH 0 -- pending mri/neuro consult

## 2021-04-26 NOTE — ED CDU PROVIDER SUBSEQUENT DAY NOTE - HISTORY
Resting comfortably. Pending MRI and neuro consult.
No acute infarct on MRI. Stable for discharge. Follow up with vestibular clinic.

## 2021-04-26 NOTE — ED CDU PROVIDER DISPOSITION NOTE - ATTENDING CONTRIBUTION TO CARE
I, Naty Navarro MD have personally performed a face to face diagnostic evaluation on this patient.  I have reviewed the ACP note and agree with the history, exam, and plan of care, except as noted.     mri neg neuro evaluated ok to dc with vestibular rehab follow up

## 2021-05-12 PROBLEM — G45.9 TRANSIENT CEREBRAL ISCHEMIC ATTACK, UNSPECIFIED: Chronic | Status: ACTIVE | Noted: 2021-04-24

## 2021-05-14 ENCOUNTER — APPOINTMENT (OUTPATIENT)
Dept: NEUROLOGY | Facility: CLINIC | Age: 66
End: 2021-05-14
Payer: MEDICARE

## 2021-05-14 VITALS
TEMPERATURE: 97.2 F | WEIGHT: 212 LBS | SYSTOLIC BLOOD PRESSURE: 112 MMHG | DIASTOLIC BLOOD PRESSURE: 60 MMHG | BODY MASS INDEX: 28.71 KG/M2 | HEIGHT: 72 IN

## 2021-05-14 DIAGNOSIS — R42 DIZZINESS AND GIDDINESS: ICD-10-CM

## 2021-05-14 PROCEDURE — 99213 OFFICE O/P EST LOW 20 MIN: CPT

## 2021-05-14 PROCEDURE — 99072 ADDL SUPL MATRL&STAF TM PHE: CPT

## 2021-05-14 RX ORDER — METOPROLOL SUCCINATE 25 MG/1
25 TABLET, EXTENDED RELEASE ORAL
Qty: 30 | Refills: 0 | Status: DISCONTINUED | COMMUNITY
Start: 2018-10-05 | End: 2021-05-14

## 2021-05-14 RX ORDER — METHYLPREDNISOLONE 4 MG/1
4 TABLET ORAL
Qty: 1 | Refills: 1 | Status: DISCONTINUED | COMMUNITY
Start: 2019-03-27 | End: 2021-05-14

## 2021-05-14 RX ORDER — DIAZEPAM 5 MG/1
5 TABLET ORAL
Qty: 30 | Refills: 0 | Status: DISCONTINUED | COMMUNITY
Start: 2019-03-27 | End: 2021-05-14

## 2021-05-14 RX ORDER — SILDENAFIL 50 MG/1
50 TABLET ORAL
Qty: 10 | Refills: 0 | Status: DISCONTINUED | COMMUNITY
Start: 2018-07-19 | End: 2021-05-14

## 2021-05-14 NOTE — PHYSICAL EXAM
[General Appearance - Alert] : alert [General Appearance - In No Acute Distress] : in no acute distress [General Appearance - Well Nourished] : well nourished [General Appearance - Well Developed] : well developed [Person] : oriented to person [Place] : oriented to place [Time] : oriented to time [Remote Intact] : remote memory intact [Registration Intact] : recent registration memory intact [Span Intact] : the attention span was normal [Concentration Intact] : normal concentrating ability [Visual Intact] : visual attention was ~T not ~L decreased [Naming Objects] : no difficulty naming common objects [Repeating Phrases] : no difficulty repeating a phrase [Fluency] : fluency intact [Comprehension] : comprehension intact [Current Events] : adequate knowledge of current events [Past History] : adequate knowledge of personal past history [Cranial Nerves Optic (II)] : visual acuity intact bilaterally,  visual fields full to confrontation, pupils equal round and reactive to light [Cranial Nerves Oculomotor (III)] : extraocular motion intact [Cranial Nerves Trigeminal (V)] : facial sensation intact symmetrically [Cranial Nerves Facial (VII)] : face symmetrical [Cranial Nerves Vestibulocochlear (VIII)] : hearing was intact bilaterally [Cranial Nerves Glossopharyngeal (IX)] : tongue and palate midline [Cranial Nerves Accessory (XI - Cranial And Spinal)] : head turning and shoulder shrug symmetric [Cranial Nerves Hypoglossal (XII)] : there was no tongue deviation with protrusion [Motor Tone] : muscle tone was normal in all four extremities [Motor Strength] : muscle strength was normal in all four extremities [Involuntary Movements] : no involuntary movements were seen [No Muscle Atrophy] : normal bulk in all four extremities [Paresis Pronator Drift Right-Sided] : no pronator drift on the right [Paresis Pronator Drift Left-Sided] : no pronator drift on the left [Motor Strength Upper Extremities Bilaterally] : strength was normal in both upper extremities [Motor Strength Lower Extremities Bilaterally] : strength was normal in both lower extremities [Sensation Tactile Decrease] : light touch was intact [Sensation Pain / Temperature Decrease] : pain and temperature was intact [Sensation Vibration Decrease] : vibration was intact [Proprioception] : proprioception was intact [Abnormal Walk] : normal gait [Balance] : balance was intact [Tremor] : no tremor present [Coordination - Dysmetria Impaired Finger-to-Nose Bilateral] : not present [2+] : Patella left 2+ [Sclera] : the sclera and conjunctiva were normal [PERRL With Normal Accommodation] : pupils were equal in size, round, reactive to light, with normal accommodation [Extraocular Movements] : extraocular movements were intact [No APD] : no afferent pupillary defect [No DUNG] : no internuclear ophthalmoplegia [Full Visual Field] : full visual field

## 2021-05-14 NOTE — DATA REVIEWED
[de-identified] : Results of the MRI, CT angiogram head, CT angiogram of neck and brain MRIs in the history of present illness

## 2021-05-14 NOTE — ASSESSMENT
[FreeTextEntry1] : This is a 65-year-old man who was seen in the hospital likely peripheral vertigo. She's had 2 MRIs in the last month to have both been normal. I had a CT angiogram of head and neck which did not show any significant stenoses are healthy toes. He will undergo vestibular therapy. Here he has a prescription is given to him by the hospital. I will see him back in the office in 3 months for routine neurologic followup, sooner should the need arise.

## 2021-05-14 NOTE — CONSULT LETTER
[Dear  ___] : Dear  [unfilled], [Courtesy Letter:] : I had the pleasure of seeing your patient, [unfilled], in my office today. [Please see my note below.] : Please see my note below. [Consult Closing:] : Thank you very much for allowing me to participate in the care of this patient.  If you have any questions, please do not hesitate to contact me. [Sincerely,] : Sincerely, [FreeTextEntry3] : Juanjo Hercules M.D., Ph.D. DPN-N\par [facility]\par Neurology at [place]\par Medical Director of Stroke Services\par [facility]\par

## 2021-05-14 NOTE — HISTORY OF PRESENT ILLNESS
[FreeTextEntry1] : Posthospitalization visit in May 14, 2021:\par This is a 65-year-old man who presents today for post hospital visit for vertigo. He was seen a few weeks ago at [facility] with vertigo. An MRI of his brain done which was normal. He was doing a bit better and was discharged with vestibular rehabilitation. A few days later he had continued symptoms and also is amnestic. He went to [facility] for evaluation. Reviewing his reports he had a normal CT angiogram of his brain. He had a normal CT angiogram of his neck. He had MRI of his brain with and without contrast which was normal without evidence for stroke mass or bleed. He had a MOLLY done which showed EF of 55-60% with no evidence for a patent foramen ovale or thrombus in the left atrium or left atrial appendage. He states he had his arrival to change to [facility] and is doing better. He is here today for neurologic followup overall doing better without new symptoms.

## 2021-05-17 NOTE — ED PROVIDER NOTE - IV ALTEPLASE ADMINISTRATION
HISTORY OF PRESENT ILLNESS:  Ezra Castro is a pleasant 65 year old male who presents to the clinic with his wife for follow-up of multiple medical problems     Patient has hyperlipidemia.  His trying to watch his diet.    He has impaired fasting glucose.  No polyuria or polydipsia.  Patient wakes up 1 time for micturition at night.    He has history of abdominal aortic dilatation at 3.1 cm.    Patient had GERD symptoms however secondary to sense of upper abdominal fullness and early satiety he was referred to GI.  Patient had upper endoscopy that has revealed Cee's esophagus with recommendation to continue omeprazole 40 mg daily.  Recommendation is to follow-up in 6 months with GI and to have repeat EGD in 3 years.  He also had a colonoscopy with recall in 10 years.  He reports improvement of his symptoms at this time.    Patient continues to smoke at most 10 cigarettes daily which is an improvement.  He admits to cough mainly in the morning however that has markedly improved with decreasing amount of smoking by almost 50%.  He denies shortness of breath or wheezing but he admits to a lot of postnasal drip.  No chest pain.  Is able to climb over 2 flights of stairs with no symptoms.    Last labs have revealed elevated hemoglobin and decreased GFR.  Patient denies any urinary symptoms.    PAST MEDICAL HISTORY:  Past Medical History:   Diagnosis Date   • Aortic dilatation (CMS/[facility])     Repeat in 12 months   • Cee's esophagus 03/04/2021    dr. burleson, recall 3 years   • COPD (chronic obstructive pulmonary disease) (CMS/[facility])        MEDICATIONS:   Current Outpatient Medications   Medication Sig Dispense Refill   • omeprazole (PriLOSEC) 40 MG capsule Take 1 capsule by mouth daily. 90 capsule 2   • nicotine (NICODERM) 21 MG/24HR patch Place 1 patch onto the skin every 24 hours for 6 weeks. 42 each 0   • nicotine (NICODERM) 14 MG/24HR patch Place 1 patch onto the skin daily for 2 weeks. 14 each 0   • nicotine  (NICODERM) 7 MG/24HR patch Place 1 patch onto the skin daily. 14 each 0   • nicotine (Nicoderm CQ) 21 MG/24HR patch Place 1 patch onto the skin every 24 hours. X 6 weeks then 14 mg x 2 weeks then 7 mg x 2 weeks 48 each 0     No current facility-administered medications for this visit.       ALLERGIES:   ALLERGIES:  No Known Allergies    SOCIAL HISTORY:  Social History     Socioeconomic History   • Marital status: /Civil Union     Spouse name: Not on file   • Number of children: Not on file   • Years of education: Not on file   • Highest education level: Not on file   Occupational History   • Not on file   Tobacco Use   • Smoking status: Current Every Day Smoker     Packs/day: 1.50     Years: 45.00     Pack years: 67.50     Types: Cigarettes   • Smokeless tobacco: Never Used   Substance and Sexual Activity   • Alcohol use: Not Currently   • Drug use: Never   • Sexual activity: Not on file   Other Topics Concern   • Not on file   Social History Narrative   • Not on file     Social Determinants of Health     Financial Resource Strain:    • Social Determinants: Financial Resource Strain:    Food Insecurity:    • Social Determinants: Food Insecurity:    Transportation Needs:    • Lack of Transportation (Medical):    • Lack of Transportation (Non-Medical):    Physical Activity:    • Days of Exercise per Week:    • Minutes of Exercise per Session:    Stress:    • Social Determinants: Stress:    Social Connections:    • Social Determinants: Social Connections:    Intimate Partner Violence: Not At Risk   • Social Determinants: Intimate Partner Violence Past Fear: No   • Social Determinants: Intimate Partner Violence Current Fear: No       PHYSICAL EXAMINATION:   GENERAL: The patient is alert, awake, oriented x3, in no acute distress.   VITAL SIGNS:  Visit Vitals  /72   Pulse 80   Temp 97.3 °F (36.3 °C)   Resp 16   Ht 5' 3\" (1.6 m)   Wt 65.9 kg   SpO2 97%   BMI 25.72 kg/m²       HEENT: Pupils equal, round and  reactive to light and accommodation. Extraocular muscles intact. Anicteric sclerae. Tympanic membranes clear. Throat :  Patient has face mask  NECK: Supple with full range of motion. No submandibular, cervical, or supraclavicular lymphadenopathy. No thyromegaly. No jugular venous distension or audible bruit.   HEART: Regular rate and rhythm. No S3 or S4, no murmurs.   LUNGS: Clear to auscultation bilaterally. No rhonchi or rales. Adequate air entry. Normal respiratory effort.   ABDOMEN: Soft, nontender, nondistended .  No hepatosplenomegaly, no masses.   EXTREMITIES: No edema, clubbing or cyanosis. Dorsalis pedis 2+ bilaterally.   Psychiatry:  Normal mood.  Varied appropriate affect      ASSESSMENT AND PLAN:   Hyperlipidemia, unspecified hyperlipidemia type  (primary encounter diagnosis)  Comment:  Triglycerides where 200 than 1  Plan:  Low lipid diet    IFG (impaired fasting glucose)  Plan:  Watch diet    Aortic dilatation (CMS/HCC)  Comment:  Blood pressure fairly controlled  Plan:  Quit smoking    Cee's esophagus without dysplasia  Comment:  Discussed risks in details including interstitial nephritis however at this point benefits outweigh risks  Plan:  Continue proton pump inhibitor    Personal history of tobacco use, presenting hazards to health  Comment:  He does not meet criteria for screening for lung cancer given cough.  Discussed in details with patient  Reviewed risks and dangers of smoking, urged patient to quit, and offered any assistance in efforts to quit.    Discussed options to help quitting.  Patient is interested in trying Nicoderm patch  Plan: nicotine (NICODERM) 14 MG/24HR patch    Cough  Comment:  We have discussed referral to Pulmonary Medicine patient does not wish to do that at this time.  Plan:  Quit smoking completely  Treat postnasal drip    Postnasal drip  Plan: azelastine (ASTELIN) 0.1 % nasal spray    If symptoms persist patient will call the clinic for a prescription for  antihistamine.  Discussed risks and benefits in details    Elevated hemoglobin (CMS/HCC)  Comment:  Probably related to smoking  Plan: CBC WITH DIFFERENTIAL  Quit smoking and drink more fluids    Decreased GFR  Plan: BASIC METABOLIC PANEL     Drink plenty of water before testing  If abnormal will need further workup discussed with patient and his wife      Follow-up in 6 months.  Sooner if needed.       No

## 2021-08-17 ENCOUNTER — APPOINTMENT (OUTPATIENT)
Dept: NEUROLOGY | Facility: CLINIC | Age: 66
End: 2021-08-17

## 2023-08-24 NOTE — ED PROVIDER NOTE - PHYSICAL EXAMINATION
[FreeTextEntry1] : Autistic spectrum disorder. Will get EEG. RTO prn. Rx written for chloral hydrate 1500 mg with 1 refill.  ref - [id number] .  Note sent to Dr Boudreaux(PCP). Total clinician time spent on 8/24/2023 is 47 minutes including preparing to see the patient, obtaining and/or reviewing and confirming history, performing a medically necessary and appropriate examination, counseling and educating the patient and/or family, documenting clinical information in the EHR and communicating and/or referring to other healthcare professionals. General: well appearing, NAD  Head: NC, AT  EENT: EOMI, no scleral icterus  Cardiac: RRR, no apparent murmurs, no lower extremity edema  Respiratory: CTABL, no respiratory distress   Abdomen: soft, ND, NT, nonperitonitic  MSK/Vascular: full ROM, soft compartments, warm extremities  Neuro: AAOx3, sensation to light touch intact  Psych: calm, cooperative

## 2024-02-20 ENCOUNTER — APPOINTMENT (OUTPATIENT)
Dept: UROLOGY | Facility: CLINIC | Age: 69
End: 2024-02-20
Payer: MEDICARE

## 2024-02-20 ENCOUNTER — APPOINTMENT (OUTPATIENT)
Dept: UROLOGY | Facility: CLINIC | Age: 69
End: 2024-02-20

## 2024-02-20 DIAGNOSIS — E78.5 HYPERLIPIDEMIA, UNSPECIFIED: ICD-10-CM

## 2024-02-20 DIAGNOSIS — I10 ESSENTIAL (PRIMARY) HYPERTENSION: ICD-10-CM

## 2024-02-20 DIAGNOSIS — F41.8 OTHER SPECIFIED ANXIETY DISORDERS: ICD-10-CM

## 2024-02-20 DIAGNOSIS — I48.91 UNSPECIFIED ATRIAL FIBRILLATION: ICD-10-CM

## 2024-02-20 PROCEDURE — 99204 OFFICE O/P NEW MOD 45 MIN: CPT

## 2024-02-20 RX ORDER — DOCUSATE SODIUM 100 MG/1
100 CAPSULE ORAL 3 TIMES DAILY
Qty: 3 | Refills: 0 | Status: ACTIVE | COMMUNITY
Start: 2024-02-20

## 2024-02-20 RX ORDER — LISINOPRIL 5 MG/1
5 TABLET ORAL
Refills: 0 | Status: ACTIVE | COMMUNITY
Start: 2018-05-23

## 2024-02-20 RX ORDER — DISOPYRAMIDE PHOSPHATE 150 MG/1
150 CAPSULE, GELATIN COATED ORAL
Refills: 0 | Status: ACTIVE | COMMUNITY
Start: 2018-03-14

## 2024-02-20 RX ORDER — SIMVASTATIN 20 MG/1
20 TABLET, FILM COATED ORAL
Qty: 90 | Refills: 0 | Status: COMPLETED | COMMUNITY
Start: 2018-03-14 | End: 2024-02-20

## 2024-02-20 RX ORDER — NIFEDIPINE 30 MG/1
30 TABLET, EXTENDED RELEASE ORAL
Refills: 0 | Status: ACTIVE | COMMUNITY
Start: 2018-05-23

## 2024-02-20 RX ORDER — ATORVASTATIN CALCIUM 80 MG/1
80 TABLET, FILM COATED ORAL
Refills: 0 | Status: ACTIVE | COMMUNITY
Start: 2024-01-21

## 2024-02-20 RX ORDER — CLOPIDOGREL BISULFATE 75 MG/1
75 TABLET, FILM COATED ORAL
Refills: 0 | Status: ACTIVE | COMMUNITY
Start: 2023-07-12

## 2024-02-20 RX ORDER — APIXABAN 5 MG/1
5 TABLET, FILM COATED ORAL
Refills: 0 | Status: ACTIVE | COMMUNITY

## 2024-02-20 NOTE — REVIEW OF SYSTEMS
[Eyesight Problems] : eyesight problems [Palpitations] : palpitations [Constipation] : constipation [Heartburn] : heartburn [see HPI] : see HPI [No erections] : no erections [Urine retention] : urine retention [Strain or push to urinate] : strain or push to urinate [Slow urine stream] : slow urine stream [Joint Pain] : joint pain [Skin Lesions] : skin lesion [Erectile Dysfunction] : erectile dysfunction [Easy Bleeding] : a tendency for easy bleeding [Easy Bruising] : a tendency for easy bruising [Negative] : Psychiatric [FreeTextEntry2] : Hypertension [FreeTextEntry1] : on anticoagulants

## 2024-02-20 NOTE — HISTORY OF PRESENT ILLNESS
[Home] : at home, [unfilled] , at the time of the visit. [Medical Office: ([facility])___] : at the medical office located in  [Spouse] : spouse [Verbal consent obtained from patient] : the patient, [unfilled] [FreeTextEntry1] : Dear Dr. Matthew Levy (PCP)   Thank you so much for the referral to help care for your patient.   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 His PSA is 25.05 ng/mL, drawn on 2/19/24. His historical PSA results are detailed below. He has not had a pelvic MRI nor a prostate biopsy. He denies any family history of  malignancies.   LUTS History Delayed stream, strain to start stream, nocturia x1   PSA History 25.05 ng/mL on 2/19/24 24.02 ng/mL on 12/21/23  No morning erections; cannot complete intercourse with spouse.   MRI History N/A   Biopsy History N/A   The patient denies fevers, chills, nausea and or vomiting and no unexplained weight loss.   All pertinent laboratory and physician notes were reviewed.  Questionnaire results were discussed with patient.   02/20/2024 IPSS 7 QOL 2 IIEF: 8   Prostate cancer screening: the patient and I spoke at length about prostate cancer screening, its risks and its benefits. The patient has 2 (age, elevated PSA) risk factors for prostate cancer.  He understands that many men with prostate cancer will die with the disease rather than of it and we also discussed the results large multi-center American and  prostate cancer screening trials. He also understands that PSA in and of itself does not diagnose prostate cancer but only assesses risk to a certain degree.   The patient understands that to definitively screen for prostate cancer, a biopsy is required, and this procedure has risks, including bleeding, infection, ED and urinary retention. The patient opted to proceed with a fusion biopsy.

## 2024-02-20 NOTE — REASON FOR VISIT
Bill For Surgical Tray: no Expected Date Of Service: 10/04/2018 Performing Laboratory: -[id number] Billing Type: Third-Party Bill Lab Facility: [id number] [Initial Visit ___] : [unfilled] is here today for an initial visit  for [unfilled] [Spouse] : spouse

## 2024-02-20 NOTE — ASSESSMENT
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 His PSA is 25.05 ng/mL, drawn on 2/19/24. His historical PSA results are detailed below. He has not had a pelvic MRI nor a prostate biopsy. He denies any family history of  malignancies.   LUTS History Delayed stream, strain to start stream, nocturia x1  Discussed transperineal fusion guided biopsy with the patient today. Explained to patient that the MRI images and transrectal ultrasound images allows us to retrieve biopsy samples from the lesion seen on the MRI. We will also take samples from a 12-core biopsy template of the prostate to assess for the presence of clinically significant cancer. Discussed the use of local anesthesia for this procedure, in addition to the option for a mild oral sedative. Reviewed the importance of a fleet enema the morning of the procedure. Discussed with patient that a transperineal approach has a low risk for infection. Discussed risks and benefits of a transperineal fusion biopsy.   Patient agrees to move forward with transperineal biopsy with Dr. Hernandez. A written copy of instructions has been sent to the email address on file. Patient verbalized understanding of the procedure and instructions prior to his biopsy appointment.  1. Schedule MRI - MRI needed for fusion biopsy 2. Schedule MRI review appointment with Dr. Hernandez 3. Schedule TP biopsy at [address] [place] [id number] with Dr. Hernandez 4. Schedule post biopsy review appointment with Dr. Hernandez

## 2024-02-21 ENCOUNTER — RESULT REVIEW (OUTPATIENT)
Age: 69
End: 2024-02-21

## 2024-02-21 ENCOUNTER — OUTPATIENT (OUTPATIENT)
Dept: OUTPATIENT SERVICES | Facility: HOSPITAL | Age: 69
LOS: 1 days | End: 2024-02-21
Payer: MEDICARE

## 2024-02-21 ENCOUNTER — APPOINTMENT (OUTPATIENT)
Dept: MRI IMAGING | Facility: IMAGING CENTER | Age: 69
End: 2024-02-21
Payer: MEDICARE

## 2024-02-21 DIAGNOSIS — R97.20 ELEVATED PROSTATE SPECIFIC ANTIGEN [PSA]: ICD-10-CM

## 2024-02-21 DIAGNOSIS — Z98.89 OTHER SPECIFIED POSTPROCEDURAL STATES: Chronic | ICD-10-CM

## 2024-02-21 PROCEDURE — 72197 MRI PELVIS W/O & W/DYE: CPT

## 2024-02-21 PROCEDURE — 72197 MRI PELVIS W/O & W/DYE: CPT | Mod: 26

## 2024-02-21 PROCEDURE — 76498 UNLISTED MR PROCEDURE: CPT

## 2024-02-21 PROCEDURE — A9585: CPT

## 2024-02-21 PROCEDURE — 76498P: CUSTOM | Mod: 26

## 2024-02-23 ENCOUNTER — NON-APPOINTMENT (OUTPATIENT)
Age: 69
End: 2024-02-23

## 2024-02-26 ENCOUNTER — OUTPATIENT (OUTPATIENT)
Dept: OUTPATIENT SERVICES | Facility: HOSPITAL | Age: 69
LOS: 1 days | End: 2024-02-26
Payer: MEDICARE

## 2024-02-26 DIAGNOSIS — Z98.89 OTHER SPECIFIED POSTPROCEDURAL STATES: Chronic | ICD-10-CM

## 2024-02-26 DIAGNOSIS — R97.20 ELEVATED PROSTATE SPECIFIC ANTIGEN [PSA]: ICD-10-CM

## 2024-02-26 PROCEDURE — 76377 3D RENDER W/INTRP POSTPROCES: CPT | Mod: 26

## 2024-02-26 PROCEDURE — C8001: CPT

## 2024-03-05 ENCOUNTER — APPOINTMENT (OUTPATIENT)
Dept: UROLOGY | Facility: CLINIC | Age: 69
End: 2024-03-05
Payer: MEDICARE

## 2024-03-05 DIAGNOSIS — R93.5 ABNORMAL FINDINGS ON DIAGNOSTIC IMAGING OF OTHER ABDOMINAL REGIONS, INCLUDING RETROPERITONEUM: ICD-10-CM

## 2024-03-05 PROCEDURE — 99213 OFFICE O/P EST LOW 20 MIN: CPT

## 2024-03-05 PROCEDURE — G2211 COMPLEX E/M VISIT ADD ON: CPT

## 2024-03-05 NOTE — ASSESSMENT
[FreeTextEntry1] : based on PSA level, PSAD and MRI findings likely has prostate cancer. reviewed performance of TPR biopsy under local anesthesia, possible pathologies and further imaging dependent on results. he is on double AC and getting a defibrillator tomorrow  - will review with his cardiologist when safe after this procedure may need to delay biopsy

## 2024-03-05 NOTE — HISTORY OF PRESENT ILLNESS
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 His PSA is 25.05 ng/mL, drawn on 2/19/24. His historical PSA results are detailed below. He has not had a pelvic MRI nor a prostate biopsy. He denies any family history of  malignancies.   LUTS History Delayed stream, strain to start stream, nocturia x1   PSA History 25.05 ng/mL on 2/19/24 24.02 ng/mL on 12/21/23 2/24 - here to review mpMRI notes small prostate with 2 PIRADs 4 lesions

## 2024-03-19 ENCOUNTER — NON-APPOINTMENT (OUTPATIENT)
Age: 69
End: 2024-03-19

## 2024-03-20 RX ORDER — DIAZEPAM 5 MG/1
5 TABLET ORAL
Qty: 1 | Refills: 0 | Status: ACTIVE | COMMUNITY
Start: 2024-02-20 | End: 1900-01-01

## 2024-03-26 ENCOUNTER — APPOINTMENT (OUTPATIENT)
Dept: UROLOGY | Facility: CLINIC | Age: 69
End: 2024-03-26
Payer: MEDICARE

## 2024-03-26 ENCOUNTER — OUTPATIENT (OUTPATIENT)
Dept: OUTPATIENT SERVICES | Facility: HOSPITAL | Age: 69
LOS: 1 days | End: 2024-03-26
Payer: MEDICARE

## 2024-03-26 VITALS — SYSTOLIC BLOOD PRESSURE: 116 MMHG | DIASTOLIC BLOOD PRESSURE: 73 MMHG | HEART RATE: 59 BPM

## 2024-03-26 VITALS — DIASTOLIC BLOOD PRESSURE: 84 MMHG | SYSTOLIC BLOOD PRESSURE: 132 MMHG | HEART RATE: 65 BPM

## 2024-03-26 DIAGNOSIS — R97.20 ELEVATED PROSTATE, SPECIFIC ANTIGEN [PSA]: ICD-10-CM

## 2024-03-26 DIAGNOSIS — Z98.89 OTHER SPECIFIED POSTPROCEDURAL STATES: Chronic | ICD-10-CM

## 2024-03-26 DIAGNOSIS — R35.0 FREQUENCY OF MICTURITION: ICD-10-CM

## 2024-03-26 PROCEDURE — 76999 ECHO EXAMINATION PROCEDURE: CPT

## 2024-03-26 PROCEDURE — 55700: CPT

## 2024-03-26 PROCEDURE — 76999F: CUSTOM | Mod: 26

## 2024-03-27 DIAGNOSIS — R97.20 ELEVATED PROSTATE SPECIFIC ANTIGEN [PSA]: ICD-10-CM

## 2024-04-09 ENCOUNTER — APPOINTMENT (OUTPATIENT)
Dept: UROLOGY | Facility: CLINIC | Age: 69
End: 2024-04-09
Payer: MEDICARE

## 2024-04-09 PROCEDURE — 99214 OFFICE O/P EST MOD 30 MIN: CPT

## 2024-04-10 ENCOUNTER — TRANSCRIPTION ENCOUNTER (OUTPATIENT)
Age: 69
End: 2024-04-10

## 2024-04-10 LAB — PROSTATE BIOPSY: NORMAL

## 2024-05-02 ENCOUNTER — APPOINTMENT (OUTPATIENT)
Dept: NUCLEAR MEDICINE | Facility: CLINIC | Age: 69
End: 2024-05-02

## 2024-05-02 ENCOUNTER — OUTPATIENT (OUTPATIENT)
Dept: OUTPATIENT SERVICES | Facility: HOSPITAL | Age: 69
LOS: 1 days | End: 2024-05-02
Payer: MEDICARE

## 2024-05-02 DIAGNOSIS — Z98.89 OTHER SPECIFIED POSTPROCEDURAL STATES: Chronic | ICD-10-CM

## 2024-05-02 DIAGNOSIS — C61 MALIGNANT NEOPLASM OF PROSTATE: ICD-10-CM

## 2024-05-02 PROCEDURE — 78816 PET IMAGE W/CT FULL BODY: CPT | Mod: 26

## 2024-05-03 RX ORDER — DIAZEPAM 5 MG/1
5 TABLET ORAL 3 TIMES DAILY
Qty: 10 | Refills: 0 | Status: ACTIVE | COMMUNITY
Start: 2024-05-03 | End: 1900-01-01

## 2024-05-03 RX ORDER — SCOPOLAMINE 1.5 MG/1
1 PATCH, EXTENDED RELEASE TRANSDERMAL
Qty: 7 | Refills: 0 | Status: ACTIVE | COMMUNITY
Start: 2024-05-03 | End: 1900-01-01

## 2024-06-11 ENCOUNTER — APPOINTMENT (OUTPATIENT)
Dept: UROLOGY | Facility: CLINIC | Age: 69
End: 2024-06-11
Payer: MEDICARE

## 2024-06-11 DIAGNOSIS — C61 MALIGNANT NEOPLASM OF PROSTATE: ICD-10-CM

## 2024-06-11 DIAGNOSIS — N28.9 DISORDER OF KIDNEY AND URETER, UNSPECIFIED: ICD-10-CM

## 2024-06-11 PROCEDURE — 99214 OFFICE O/P EST MOD 30 MIN: CPT

## 2024-06-13 ENCOUNTER — APPOINTMENT (OUTPATIENT)
Dept: ULTRASOUND IMAGING | Facility: CLINIC | Age: 69
End: 2024-06-13

## 2024-06-13 PROBLEM — C61 ADENOCARCINOMA OF PROSTATE: Status: ACTIVE | Noted: 2024-04-09

## 2024-06-13 PROBLEM — N28.9 RENAL LESION: Status: ACTIVE | Noted: 2024-06-11

## 2024-06-13 NOTE — DISEASE MANAGEMENT
[1] : T1 [c] : c [X] : MX [>20] : >20 ng/mL [Biopsy with Fusion] : Patient had a biopsy with fusion on [7(3+4)] : Fusion Biopsy Elliston Score: 7(3+4) [Biopsy results sent to PCP/Referring Physician] : Biopsy results sent to PCP/Referring Physician [4] : 4 [BiopsyDate] : 3/24 [MeasuredProstateVolume] : 30 [TotalCores] : 14 [TotalPositiveCores] : 5 [MaxCoreInvolvement] : 90 [FreeTextEntry7] : Negative PSMA scan  [IIIA] : IIIA

## 2024-06-13 NOTE — HISTORY OF PRESENT ILLNESS
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 His PSA is 25.05 ng/mL, drawn on 2/19/24. His historical PSA results are detailed below. He has not had a pelvic MRI nor a prostate biopsy. He denies any family history of  malignancies.   LUTS History Delayed stream, strain to start stream, nocturia x1   PSA History 25.05 ng/mL on 2/19/24 24.02 ng/mL on 12/21/23 2/24 - here to review mpMRI notes small prostate with 2 PIRADs 4 lesions   4/24 here to review Biopsy results: both lesions grade 2 cancer   6/24 here to review PSMA scan and next steps

## 2024-06-14 ENCOUNTER — OUTPATIENT (OUTPATIENT)
Dept: OUTPATIENT SERVICES | Facility: HOSPITAL | Age: 69
LOS: 1 days | End: 2024-06-14
Payer: MEDICARE

## 2024-06-14 ENCOUNTER — APPOINTMENT (OUTPATIENT)
Dept: ULTRASOUND IMAGING | Facility: CLINIC | Age: 69
End: 2024-06-14
Payer: MEDICARE

## 2024-06-14 DIAGNOSIS — N28.9 DISORDER OF KIDNEY AND URETER, UNSPECIFIED: ICD-10-CM

## 2024-06-14 DIAGNOSIS — Z98.89 OTHER SPECIFIED POSTPROCEDURAL STATES: Chronic | ICD-10-CM

## 2024-06-14 PROCEDURE — 76775 US EXAM ABDO BACK WALL LIM: CPT

## 2024-06-14 PROCEDURE — 76775 US EXAM ABDO BACK WALL LIM: CPT | Mod: 26

## 2024-06-15 ENCOUNTER — OUTPATIENT (OUTPATIENT)
Dept: OUTPATIENT SERVICES | Facility: HOSPITAL | Age: 69
LOS: 1 days | End: 2024-06-15

## 2024-06-15 ENCOUNTER — APPOINTMENT (OUTPATIENT)
Dept: ULTRASOUND IMAGING | Facility: CLINIC | Age: 69
End: 2024-06-15

## 2024-06-15 DIAGNOSIS — Z98.89 OTHER SPECIFIED POSTPROCEDURAL STATES: Chronic | ICD-10-CM

## 2024-06-28 ENCOUNTER — APPOINTMENT (OUTPATIENT)
Dept: RADIATION ONCOLOGY | Facility: CLINIC | Age: 69
End: 2024-06-28
Payer: MEDICARE

## 2024-06-28 VITALS
SYSTOLIC BLOOD PRESSURE: 144 MMHG | WEIGHT: 205 LBS | RESPIRATION RATE: 17 BRPM | OXYGEN SATURATION: 100 % | DIASTOLIC BLOOD PRESSURE: 88 MMHG | HEART RATE: 69 BPM | HEIGHT: 72 IN | TEMPERATURE: 96.9 F | BODY MASS INDEX: 27.77 KG/M2

## 2024-06-28 PROCEDURE — 99204 OFFICE O/P NEW MOD 45 MIN: CPT

## 2024-07-01 LAB — PSA SERPL-MCNC: 26.7 NG/ML

## 2024-07-12 ENCOUNTER — APPOINTMENT (OUTPATIENT)
Dept: RADIATION ONCOLOGY | Facility: CLINIC | Age: 69
End: 2024-07-12
Payer: MEDICARE

## 2024-07-12 ENCOUNTER — OUTPATIENT (OUTPATIENT)
Dept: OUTPATIENT SERVICES | Facility: HOSPITAL | Age: 69
LOS: 1 days | Discharge: HOME | End: 2024-07-12
Payer: MEDICARE

## 2024-07-12 VITALS
BODY MASS INDEX: 27.9 KG/M2 | DIASTOLIC BLOOD PRESSURE: 86 MMHG | OXYGEN SATURATION: 98 % | RESPIRATION RATE: 16 BRPM | HEIGHT: 72 IN | SYSTOLIC BLOOD PRESSURE: 137 MMHG | WEIGHT: 206 LBS | HEART RATE: 57 BPM

## 2024-07-12 DIAGNOSIS — Z98.89 OTHER SPECIFIED POSTPROCEDURAL STATES: Chronic | ICD-10-CM

## 2024-07-12 PROCEDURE — 99215 OFFICE O/P EST HI 40 MIN: CPT

## 2024-07-12 PROCEDURE — G2211 COMPLEX E/M VISIT ADD ON: CPT

## 2024-07-16 ENCOUNTER — APPOINTMENT (OUTPATIENT)
Dept: UROLOGY | Facility: CLINIC | Age: 69
End: 2024-07-16
Payer: MEDICARE

## 2024-07-16 ENCOUNTER — OUTPATIENT (OUTPATIENT)
Dept: OUTPATIENT SERVICES | Facility: HOSPITAL | Age: 69
LOS: 1 days | End: 2024-07-16

## 2024-07-16 VITALS
HEART RATE: 69 BPM | RESPIRATION RATE: 16 BRPM | SYSTOLIC BLOOD PRESSURE: 157 MMHG | TEMPERATURE: 98.3 F | DIASTOLIC BLOOD PRESSURE: 98 MMHG

## 2024-07-16 DIAGNOSIS — Z98.89 OTHER SPECIFIED POSTPROCEDURAL STATES: Chronic | ICD-10-CM

## 2024-07-16 DIAGNOSIS — C61 MALIGNANT NEOPLASM OF PROSTATE: ICD-10-CM

## 2024-07-16 DIAGNOSIS — R35.0 FREQUENCY OF MICTURITION: ICD-10-CM

## 2024-07-16 PROCEDURE — G2211 COMPLEX E/M VISIT ADD ON: CPT

## 2024-07-16 PROCEDURE — 99213 OFFICE O/P EST LOW 20 MIN: CPT

## 2024-07-16 PROCEDURE — 96402 CHEMO HORMON ANTINEOPL SQ/IM: CPT

## 2024-07-16 RX ORDER — LEUPROLIDE ACETATE 22.5 MG/.375ML
22.5 INJECTION, SUSPENSION, EXTENDED RELEASE SUBCUTANEOUS
Qty: 1 | Refills: 0 | Status: COMPLETED | OUTPATIENT
Start: 2024-07-16 | End: 2024-07-16

## 2024-07-16 RX ORDER — LEUPROLIDE ACETATE 22.5 MG/.375ML
22.5 INJECTION, SUSPENSION, EXTENDED RELEASE SUBCUTANEOUS
Refills: 0 | Status: COMPLETED | OUTPATIENT
Start: 2024-07-16

## 2024-07-16 RX ADMIN — LEUPROLIDE ACETATE 0 MG: 22.5 INJECTION, SUSPENSION, EXTENDED RELEASE SUBCUTANEOUS at 00:00

## 2024-08-21 ENCOUNTER — OUTPATIENT (OUTPATIENT)
Dept: OUTPATIENT SERVICES | Facility: HOSPITAL | Age: 69
LOS: 1 days | Discharge: HOME | End: 2024-08-21

## 2024-08-21 DIAGNOSIS — D64.9 ANEMIA, UNSPECIFIED: ICD-10-CM

## 2024-08-22 ENCOUNTER — RESULT REVIEW (OUTPATIENT)
Age: 69
End: 2024-08-22

## 2024-08-22 ENCOUNTER — APPOINTMENT (OUTPATIENT)
Dept: RADIATION ONCOLOGY | Facility: CLINIC | Age: 69
End: 2024-08-22
Payer: MEDICARE

## 2024-08-22 ENCOUNTER — APPOINTMENT (OUTPATIENT)
Dept: HEMATOLOGY ONCOLOGY | Facility: CLINIC | Age: 69
End: 2024-08-22

## 2024-08-22 DIAGNOSIS — Z86.79 PERSONAL HISTORY OF OTHER DISEASES OF THE CIRCULATORY SYSTEM: ICD-10-CM

## 2024-08-22 DIAGNOSIS — C61 MALIGNANT NEOPLASM OF PROSTATE: ICD-10-CM

## 2024-08-22 DIAGNOSIS — Z86.39 PERSONAL HISTORY OF OTHER ENDOCRINE, NUTRITIONAL AND METABOLIC DISEASE: ICD-10-CM

## 2024-08-22 LAB
BASOPHILS # BLD AUTO: 0.1 K/UL — SIGNIFICANT CHANGE UP (ref 0–0.2)
BASOPHILS NFR BLD AUTO: 0.8 % — SIGNIFICANT CHANGE UP (ref 0–2)
EOSINOPHIL # BLD AUTO: 0.4 K/UL — SIGNIFICANT CHANGE UP (ref 0–0.5)
EOSINOPHIL NFR BLD AUTO: 3.4 % — SIGNIFICANT CHANGE UP (ref 0–6)
HCT VFR BLD CALC: 43.2 % — SIGNIFICANT CHANGE UP (ref 39–50)
HGB BLD-MCNC: 14.8 G/DL — SIGNIFICANT CHANGE UP (ref 13–17)
LYMPHOCYTES # BLD AUTO: 3.2 K/UL — SIGNIFICANT CHANGE UP (ref 1–3.3)
LYMPHOCYTES # BLD AUTO: 30.7 % — SIGNIFICANT CHANGE UP (ref 13–44)
MCHC RBC-ENTMCNC: 31 PG — SIGNIFICANT CHANGE UP (ref 27–34)
MCHC RBC-ENTMCNC: 34.3 G/DL — SIGNIFICANT CHANGE UP (ref 32–36)
MCV RBC AUTO: 90.3 FL — SIGNIFICANT CHANGE UP (ref 80–100)
MONOCYTES # BLD AUTO: 0.6 K/UL — SIGNIFICANT CHANGE UP (ref 0–0.9)
MONOCYTES NFR BLD AUTO: 5.6 % — SIGNIFICANT CHANGE UP (ref 2–14)
NEUTROPHILS # BLD AUTO: 6.2 K/UL — SIGNIFICANT CHANGE UP (ref 1.8–7.4)
NEUTROPHILS NFR BLD AUTO: 59.5 % — SIGNIFICANT CHANGE UP (ref 43–77)
PLATELET # BLD AUTO: 204 K/UL — SIGNIFICANT CHANGE UP (ref 150–400)
RBC # BLD: 4.79 M/UL — SIGNIFICANT CHANGE UP (ref 4.2–5.8)
RBC # FLD: 13.3 % — SIGNIFICANT CHANGE UP (ref 10.3–14.5)
WBC # BLD: 10.4 K/UL — SIGNIFICANT CHANGE UP (ref 3.8–10.5)
WBC # FLD AUTO: 10.4 K/UL — SIGNIFICANT CHANGE UP (ref 3.8–10.5)

## 2024-08-22 PROCEDURE — 99213 OFFICE O/P EST LOW 20 MIN: CPT

## 2024-08-22 PROCEDURE — G2211 COMPLEX E/M VISIT ADD ON: CPT

## 2024-08-22 NOTE — HISTORY OF PRESENT ILLNESS
[FreeTextEntry1] : History of Present Illness     Mr. Steiner is a 68-year-old male who presents for follow up appointment for prostate cancer. He previously saw Dr Interiano 6/28/2024.  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 5/14 sites and 7/16 cores on biopsy involved. Ojibwa score 3+4=7 in 6 cores/4 sites including MRI target lesions. O[place]wa score 3+3=6 in 1 core/site. 90% max. involvement. PSA 25.05 ng/mL. MRI T2, no TANYA or SV involvement. PSMA PET with no evidence of metastatic disease.  PSA Trend: 2/19/24 - 25.05 ng/mL 6/28/2024 26.70 ng/ml  In 2/2024 PSA was found to be elevated to 25.05 ng/mL, and he was referred to urology. 2/21/24 - MRI Prostate: Prostate volume 30 cc. Lesion#1, Left, posterior lateral (PZpl), midgland, peripheral zone (10 x 7 x 10 mm). Tumor bulges or deforms capsule without neurovascular bundle thickening. Lesion #2, Left, anterior (TZa), [id number], transition zone (13 x 13 x 17 mm). Tumor abuts but does not deform capsule. No evidence of neurovascular bundle invasion. No seminal vesicle invasion. No pelvic adenopathy. No suspicious bone lesions identified. PIRADS 4  3/5/2/4 - saw Dr. Hernandez (urology) in follow up. He is on double AC and getting a defibrillator tomorrow - will review with his cardiologist when safe after this procedure, may need to delay biopsy.  3/26/24 - underwent Prostate Biopsy with Dr. Hernandez (urology): Pathology - Adenocarcinoma of Prostate in 5/14 sites and 7/16 cores. Brooks score 3+4=7 in 6 cores/4 sites including MRI target lesions. Ojibwa score 3+3=6 in 1 core/site. 90% max. involvement. Note: Cribriform Brooks pattern 4 (large cribriform) is present in the case.  5/2/24 - PSMA PET: 1. Localized prostate cancer with no evidence of metastatic disease. The primary lesion shows high PSMA expression, score 3. 2. Recommend ultrasound or MR for further evaluation of bilateral renal cysts, one of which has increased in size since prior exam in 2021 and cannot be classified as simple cyst.  6/14/24 - US Kidney: Bilateral renal cysts. No suspicious renal lesions are appreciated. 6/28/24 - saw Dr. Interiano for discussion of radiation therapy options. Patient accompanied by spouse Beata. Has experience nocturia x 1 nightly with occ hesitancy, dribbling and incomplete emptying in the last few months. Denies any episodes of incontinence. Also stated erection is not as firm as he would like. Hx of having Viagra available to him for the last 5 years but has never needed to use it. Hx of Constipation currently on Colace 200mg x 3 doses daily, states due to his cardiac medication usage. Noted good relief with daily formed BM. Patient has hx of Melanoma right shoulder/back excised by Dermatology Dr Hillman. Cardiac history is extensive. Seen by Cardiology. Matt Arguello. Hx AFIB & of Ablations x 4 starting [facility] 15 yrs. ago and recently a PPM/AICD placed by [facility] in [place]. in 5/2024. Pt and spouse aware to bring the information into [facility] for future visit. Patient Education for Radiation EBRT reviewed with patient and spouse both verbally and written. PSA drawn today. All questions and concerns for today were addressed to patient's satisfaction. Patient will be contacting [facility] at [facility].  7/12/2024 Matthieu presents today wife wife. He started bicalutamide on 7/1/2024, and is scheduled to receive LHRH agonist depot next week. Reports at times, straining, incomplete emptying, intermittency and weak stream. Nocturia 1-2 times. Reports unable to get an erection. No changes in bowel movements.  EPIC 12 IPSS 12

## 2024-08-22 NOTE — VITALS
"        Abbe Whitman   2017 3:45 PM   Office Visit   MRN: [id number]    Department:  [facility]   Dept Phone:  [phone]    Description:  Male : 1944   Provider:  Armin Dixon M.D.           Reason for Visit     Follow-Up           Allergies as of 2017     Allergen Noted Reactions    Prednisone 2012       High dose caused tightness in throat. Are okay with Medrol pack per patient    Ciprofloxacin 2016       Per patient makes skin peel     Dairy Aid [Lactase] 2017       LACTOSE INTOLERANT    Gluten Meal 2017       BLOATED AND SICK    Latex 10/29/2013       Son says slight skin reaction      You were diagnosed with     Coronary artery disease due to calcified coronary lesion   [0529572]       HTN (hypertension), benign   [094348]       Dyslipidemia   [227630]       Dyspnea on exertion   [421779]       BRITTANY (obstructive sleep apnea)   [720275]         Vital Signs     Blood Pressure Pulse Height Oxygen Saturation Smoking Status       104/60 mmHg 74 1.905 m (6' 3\") 95% Never Smoker        Basic Information     Date Of Birth Sex Race Ethnicity Preferred Language    1944 Male White Non- English      Your appointments     Aug 22, 2017  3:45 PM   Heart Failure Established with Armin Dixon M.D.   [facility] (--)    [address], [address]  [place] [address] [phone]            Sep 11, 2017  9:40 AM   Established Patient Pul with KALE Amos   [facility] (--)    [address]  [address]  [place] [id number] [phone]              Problem List              ICD-10-CM Priority Class Noted - Resolved    Coronary artery disease due to calcified coronary lesion: CABG ×5 in  I25.10, I25.84   2011 - Present    HTN (hypertension), benign I10   2011 - Present    Dyslipidemia E78.5   2011 - Present    Dyspnea on exertion R06.09   2013 - Present  "    GERD (gastroesophageal reflux disease) (Chronic) K21.9   Unknown - Present    Arrhythmia (Chronic) I49.9   Unknown - Present    Myocardial infarct (Chronic) I21.3   Unknown - Present    Pleural effusion J90   10/29/2013 - Present    Chest pain R07.9   3/14/2015 - Present    Hypokalemia E87.6   3/16/2015 - Present    Atypical chest pain R07.89   3/16/2015 - Present    Mild intermittent asthma without complication J45.20   4/19/2016 - Present    Pulmonary emphysema (CMS-HCC) J43.9   4/24/2017 - Present    BRITTANY (obstructive sleep apnea) G47.33   4/24/2017 - Present    Chronic systolic congestive heart failure (CMS-HCC) I50.22   5/17/2017 - Present    Chronic obstructive pulmonary disease (CMS-HCC) J44.9   5/17/2017 - Present      Health Maintenance        Date Due Completion Dates    IMM DTaP/Tdap/Td Vaccine (1 - Tdap) 6/26/1961 ---    COLONOSCOPY 6/26/1992 ---    IMM ZOSTER VACCINE 6/26/2002 ---    IMM PNEUMOCOCCAL 65+ (ADULT) LOW/MEDIUM RISK SERIES (1 of 2 - PCV13) 6/26/2007 ---    IMM INFLUENZA (1) 9/1/2017 ---            Current Immunizations     No immunizations on file.      Below and/or attached are the medications your provider expects you to take. Review all of your home medications and newly ordered medications with your provider and/or pharmacist. Follow medication instructions as directed by your provider and/or pharmacist. Please keep your medication list with you and share with your provider. Update the information when medications are discontinued, doses are changed, or new medications (including over-the-counter products) are added; and carry medication information at all times in the event of emergency situations     Allergies:  PREDNISONE - (reactions not documented)     CIPROFLOXACIN - (reactions not documented)     DAIRY AID - (reactions not documented)     GLUTEN MEAL - (reactions not documented)     LATEX - (reactions not documented)               Medications  Valid as of: August 22, 2017 -   2:59 PM    Generic Name Brand Name Tablet Size Instructions for use    Aspirin (Chew Tab) ASA 81 MG Take 1 Tab by mouth every day.        Benazepril HCl (Tab) LOTENSIN 20 MG Take 1 Tab by mouth every evening.        Bisoprolol Fumarate (Tab) ZEBETA 5 MG Take 1 Tab by mouth every day.        Cholecalciferol (Cap) Vitamin D 2000 units Take  by mouth.        Esomeprazole Magnesium   Take  by mouth.        Fluconazole (Tab) DIFLUCAN 100 MG Take 1 Tab by mouth every day.        Furosemide (Tab) LASIX 40 MG Take 1 Tab by mouth every day.        Furosemide (Tab) LASIX 80 MG         Lansoprazole (CAPSULE DELAYED RELEASE) PREVACID 30 MG Take 30 mg by mouth every day.        Levalbuterol Tartrate (Aerosol) XOPENEX HFA 45 MCG/ACT         MethylPREDNISolone (Tablet Therapy Pack) MEDROL DOSEPAK 4 MG Take as directed.        MetOLazone (Tab) ZAROXOLYN 2.5 MG Take 2.5 mg by mouth every day.        Nitroglycerin (SL Tab) NITROSTAT 0.4 MG Place 1 Tab under tongue as needed for Chest Pain.        Omeprazole (CAPSULE DELAYED RELEASE) PRILOSEC 20 MG Take 20 mg by mouth every day.        Potassium Chloride (Tab CR) KLOR-CON 10 MEQ         Rosuvastatin Calcium (Tab) CRESTOR 40 MG Take 1 Tab by mouth every day.        Sulfamethoxazole-Trimethoprim (Tab) BACTRIM -160 MG         Tamsulosin HCl (Cap) FLOMAX 0.4 MG Take 0.4 mg by mouth 2 Times a Day.        TraZODone HCl (Tab) DESYREL 100 MG Take 1 Tab by mouth at bedtime as needed for Sleep.        Umeclidinium-Vilanterol (AEROSOL POWDER, BREATH ACTIVATED) Umeclidinium-Vilanterol 62.5-25 MCG/INH Take 1 Inhalation by mouth every day.        Zolpidem Tartrate (Tab) AMBIEN 5 MG Take 1 Tab by mouth at bedtime as needed for Sleep (1 to 3 po qhs prn insomnia/sleep study. Bring to sleep study.).        .                 Medicines prescribed today were sent to:     [facility]UEL HERNANDE[facility] - [address] [place] [address] [address] [facility] [address]    Phone: [phone] Fax:  [phone]    Open 24 Hours?: No      Medication refill instructions:       If your prescription bottle indicates you have medication refills left, it is not necessary to call your provider’s office. Please contact your pharmacy and they will refill your medication.    If your prescription bottle indicates you do not have any refills left, you may request refills at any time through one of the following ways: The online [employer] system (except Urgent Care), by calling your provider’s office, or by asking your pharmacy to contact your provider’s office with a refill request. Medication refills are processed only during regular business hours and may not be available until the next business day. Your provider may request additional information or to have a follow-up visit with you prior to refilling your medication.   *Please Note: Medication refills are assigned a new Rx number when refilled electronically. Your pharmacy may indicate that no refills were authorized even though a new prescription for the same medication is available at the pharmacy. Please request the medicine by name with the pharmacy before contacting your provider for a refill.           [employer] Access Code: [email or web address]  Expires: 9/21/2017  2:59 PM    [employer]  A secure, online tool to manage your health information     [employer]’s [employer]® is a secure, online tool that connects you to your personalized health information from the privacy of your home -- day or night - making it very easy for you to manage your healthcare. Once the activation process is completed, you can even access your medical information using the [employer] lukas, which is available for free in the Apple Lukas store or Google Play store.     Or[employer] provides the following levels of access (as shown below):   My Chart Features   [facility] Doctor [facility]  Specialists [facility]  Urgent  Care Non-[facility]  Primary Care  Doctor   Email your healthcare team securely and  privately 24/7 X X X    Manage appointments: schedule your next appointment; view details of past/upcoming appointments X      Request prescription refills. X      View recent personal medical records, including lab and immunizations X X X X   View health record, including health history, allergies, medications X X X X   Read reports about your outpatient visits, procedures, consult and ER notes X X X X   See your discharge summary, which is a recap of your hospital and/or ER visit that includes your diagnosis, lab results, and care plan. X X       How to register for ISpeak:  1. Go to  [email or web address].  2. Click on the Sign Up Now box, which takes you to the New Member Sign Up page. You will need to provide the following information:  a. Enter your ISpeak Access Code exactly as it appears at the top of this page. (You will not need to use this code after you’ve completed the sign-up process. If you do not sign up before the expiration date, you must request a new code.)   b. Enter your date of birth.   c. Enter your home email address.   d. Click Submit, and follow the next screen’s instructions.  3. Create a ISpeak ID. This will be your ISpeak login ID and cannot be changed, so think of one that is secure and easy to remember.  4. Create a ISpeak password. You can change your password at any time.  5. Enter your Password Reset Question and Answer. This can be used at a later time if you forget your password.   6. Enter your e-mail address. This allows you to receive e-mail notifications when new information is available in ISpeak.  7. Click Sign Up. You can now view your health information.    For assistance activating your ISpeak account, call [phone]         [Maximal Pain Intensity: 0/10] : 0/10 [Least Pain Intensity: 0/10] : 0/10 [80: Normal activity with effort; some signs or symptoms of disease.] : 80: Normal activity with effort; some signs or symptoms of disease.  [ECOG Performance Status: 1 - Restricted in physically strenuous activity but ambulatory and able to carry out work of a light or sedentary nature] : Performance Status: 1 - Restricted in physically strenuous activity but ambulatory and able to carry out work of a light or sedentary nature, e.g., light house work, office work [0 - No Distress] : Distress Level: 0

## 2024-08-22 NOTE — DISEASE MANAGEMENT
[1] : T1 [c] : c [0] : M0 [>20] : >20 ng/mL [Biopsy with Fusion] : Patient had a biopsy with fusion on [7(3+4)] : Fusion Biopsy [place] Score: 7(3+4) [] : Patient had a Prostate MRI [4] : 4 [IIB] : IIB [Radiation Therapy] : Radiation Therapy [Androgen Ablation] : Androgen Ablation [BiopsyDate] : 03/24 [TotalCores] : 16 [TotalPositiveCores] : 7 [MaxCoreInvolvement] : 90

## 2024-08-22 NOTE — PHYSICAL EXAM
[Normal] : oriented to person, place and time, the affect was normal, the mood was normal and not anxious [FreeTextEntry1] : deferred [de-identified] : deferred

## 2024-08-23 LAB
ALBUMIN SERPL ELPH-MCNC: 4.9 G/DL — SIGNIFICANT CHANGE UP (ref 3.3–5)
ALP SERPL-CCNC: 96 U/L — SIGNIFICANT CHANGE UP (ref 40–120)
ALT FLD-CCNC: 29 U/L — SIGNIFICANT CHANGE UP (ref 10–45)
ANION GAP SERPL CALC-SCNC: 15 MMOL/L — SIGNIFICANT CHANGE UP (ref 5–17)
AST SERPL-CCNC: 26 U/L — SIGNIFICANT CHANGE UP (ref 10–40)
BILIRUB SERPL-MCNC: 0.6 MG/DL — SIGNIFICANT CHANGE UP (ref 0.2–1.2)
BUN SERPL-MCNC: 15 MG/DL — SIGNIFICANT CHANGE UP (ref 7–23)
CALCIUM SERPL-MCNC: 9.6 MG/DL — SIGNIFICANT CHANGE UP (ref 8.4–10.5)
CHLORIDE SERPL-SCNC: 102 MMOL/L — SIGNIFICANT CHANGE UP (ref 96–108)
CO2 SERPL-SCNC: 24 MMOL/L — SIGNIFICANT CHANGE UP (ref 22–31)
CREAT SERPL-MCNC: 1.15 MG/DL — SIGNIFICANT CHANGE UP (ref 0.5–1.3)
EGFR: 68 ML/MIN/1.73M2 — SIGNIFICANT CHANGE UP
GLUCOSE SERPL-MCNC: 127 MG/DL — HIGH (ref 70–99)
POTASSIUM SERPL-MCNC: 4.3 MMOL/L — SIGNIFICANT CHANGE UP (ref 3.5–5.3)
POTASSIUM SERPL-SCNC: 4.3 MMOL/L — SIGNIFICANT CHANGE UP (ref 3.5–5.3)
PROT SERPL-MCNC: 7.3 G/DL — SIGNIFICANT CHANGE UP (ref 6–8.3)
PSA SERPL-MCNC: 8.05 NG/ML — HIGH (ref 0–4)
SODIUM SERPL-SCNC: 141 MMOL/L — SIGNIFICANT CHANGE UP (ref 135–145)
TESTOST FREE SERPL-MCNC: 0.4 PG/ML — LOW (ref 5.9–27)
TESTOST FREE+TOTAL PANEL SERPL-MCNC: 6.1 NG/DL — LOW (ref 300–740)

## 2024-09-06 DIAGNOSIS — C61 MALIGNANT NEOPLASM OF PROSTATE: ICD-10-CM

## 2024-09-06 RX ORDER — AMOXICILLIN AND CLAVULANATE POTASSIUM 875; 125 MG/1; MG/1
875-125 TABLET, COATED ORAL
Qty: 6 | Refills: 0 | Status: ACTIVE | COMMUNITY
Start: 2024-09-06 | End: 1900-01-01

## 2024-09-13 ENCOUNTER — NON-APPOINTMENT (OUTPATIENT)
Age: 69
End: 2024-09-13

## 2024-09-13 RX ORDER — AMOXICILLIN AND CLAVULANATE POTASSIUM 875; 125 MG/1; MG/1
875-125 TABLET, COATED ORAL
Qty: 6 | Refills: 0 | Status: ACTIVE | COMMUNITY
Start: 2024-09-13 | End: 1900-01-01

## 2024-09-16 ENCOUNTER — NON-APPOINTMENT (OUTPATIENT)
Age: 69
End: 2024-09-16

## 2024-09-17 PROCEDURE — 76872 US TRANSRECTAL: CPT | Mod: 26

## 2024-09-17 PROCEDURE — 55876 PLACE RT DEVICE/MARKER PROS: CPT

## 2024-09-17 PROCEDURE — 55874 TPRNL PLMT BIODEGRDABL MATRL: CPT

## 2024-09-17 NOTE — PROCEDURE
[FreeTextEntry1] : fiducial marker placement, hydrogel spacer placement [FreeTextEntry2] : prostate cancer [FreeTextEntry3] : Procedure Performed:  TRANSPERINEAL PLACEMENT OF FIDUCIAL MARKERS AND SPACEOAR GEL   Indication: C61 Prostate adenocarcinoma; IN PREPARATION FOR RADIATION THERAPY FOR PROSTATE CANCER   Preoperative Diagnosis: Prostate cancer Postoperative Diagnosis: Prostate cancer Anesthesia: Local   In preparation for the procedure, he self-administered an enema one hour before leaving home. He was prescribed a 3-day course of oral antibiotics twice daily to be started a day prior to the procedure. Topical EMLA cream was applied to the perineal area prior to procedure. Patient was administered Tylenol 650 mg, and Zofran 4 mg in the department prior to procedure.   Procedure risk and benefits were reviewed with patient and a written consent was obtained prior to procedure. A time out was observed with patient name, date of birth, procedure, position, and site verified.   Patient was placed in a dorsal lithotomy position. Chloraprep was used to prep the skin. Less than 10 cc of Lidocaine 2% and sodium bicarbonate 8.4% was injected subcutaneously.   While maintaining aseptic technique, an ultrasound probe was inserted into the rectum to visualize the prostate.  Afterwards, 20 cc of Lidocaine and sodium bicarbonate was injected internally at the prostate apex and bilateral neurovascular bundles for the nerve block. Next, the hydrogel spacer kit was opened onto the sterile field and the hydrogel injection apparatus was prepared. Three calypso beacon / gold fiducial markers were prepared on the sterile field. One fiducial marker was placed into each of the following sites: right base, right apex, and left mid, via transperineal needles under ultrasound guidance.   A transperineal needle was positioned into the mid-line perirectal fat between the anterior rectal wall and prostate under ultrasound guidance. Less than 10 cc of saline was injected via the needle to hydrodissect the space and confirm proper placement in both axial and sagittal views. The syringe was aspirated to confirm the needle was extravascular. The syringe was replaced with the hydrogel injection apparatus and the gel was injected. The needle was then removed. There was minimal blood loss. The patient tolerated procedure well.   Specimens: None Complications: None EBL: Minimal   Patient was transferred to the recovery area on a monitor. Vital signs were stable. He tolerated fluid and a snack by mouth and was made comfortable. He denied pain. Post procedure instruction were given and reviewed with patient. CT/SIM appointment was confirmed. He was discharged home in a stable condition.   Impression/Plan: Mr. JOVON CARDONA tolerated the procedure well. He will return for simulation and start radiation therapy shortly afterwards. He was encouraged to contact me with any questions or concerns.

## 2024-09-26 PROCEDURE — 77263 THER RADIOLOGY TX PLNG CPLX: CPT

## 2024-09-26 PROCEDURE — 77333 RADIATION TREATMENT AID(S): CPT | Mod: 26

## 2024-10-03 PROCEDURE — 77300 RADIATION THERAPY DOSE PLAN: CPT | Mod: 26

## 2024-10-03 PROCEDURE — 77338 DESIGN MLC DEVICE FOR IMRT: CPT | Mod: 26

## 2024-10-03 PROCEDURE — 77301 RADIOTHERAPY DOSE PLAN IMRT: CPT | Mod: 26

## 2024-10-14 PROCEDURE — G6002: CPT | Mod: 26

## 2024-10-14 PROCEDURE — 77427 RADIATION TX MANAGEMENT X5: CPT

## 2024-10-15 PROCEDURE — G6002: CPT | Mod: 26

## 2024-10-16 PROCEDURE — G6002: CPT | Mod: 26

## 2024-10-17 ENCOUNTER — NON-APPOINTMENT (OUTPATIENT)
Age: 69
End: 2024-10-17

## 2024-10-17 VITALS
RESPIRATION RATE: 16 BRPM | HEIGHT: 72 IN | HEART RATE: 60 BPM | BODY MASS INDEX: 27.63 KG/M2 | OXYGEN SATURATION: 97 % | DIASTOLIC BLOOD PRESSURE: 90 MMHG | SYSTOLIC BLOOD PRESSURE: 133 MMHG | TEMPERATURE: 98.3 F | WEIGHT: 204 LBS

## 2024-10-17 PROCEDURE — G6002: CPT | Mod: 26

## 2024-10-18 PROCEDURE — G6002: CPT | Mod: 26

## 2024-10-21 PROCEDURE — 77427 RADIATION TX MANAGEMENT X5: CPT

## 2024-10-21 PROCEDURE — G6002: CPT | Mod: 26

## 2024-10-22 PROCEDURE — G6002: CPT | Mod: 26

## 2024-10-23 PROCEDURE — G6002: CPT | Mod: 26

## 2024-10-24 ENCOUNTER — NON-APPOINTMENT (OUTPATIENT)
Age: 69
End: 2024-10-24

## 2024-10-24 VITALS
HEIGHT: 72 IN | TEMPERATURE: 97.9 F | DIASTOLIC BLOOD PRESSURE: 96 MMHG | OXYGEN SATURATION: 95 % | SYSTOLIC BLOOD PRESSURE: 152 MMHG | WEIGHT: 207 LBS | BODY MASS INDEX: 28.04 KG/M2 | RESPIRATION RATE: 16 BRPM | HEART RATE: 60 BPM

## 2024-10-24 PROCEDURE — G6002: CPT | Mod: 26

## 2024-10-25 PROCEDURE — G6002: CPT | Mod: 26

## 2024-10-28 PROCEDURE — G6002: CPT | Mod: 26

## 2024-10-28 PROCEDURE — 77427 RADIATION TX MANAGEMENT X5: CPT

## 2024-10-29 PROCEDURE — G6002: CPT | Mod: 26

## 2024-10-30 ENCOUNTER — APPOINTMENT (OUTPATIENT)
Dept: UROLOGY | Facility: CLINIC | Age: 69
End: 2024-10-30

## 2024-10-30 ENCOUNTER — OUTPATIENT (OUTPATIENT)
Dept: OUTPATIENT SERVICES | Facility: HOSPITAL | Age: 69
LOS: 1 days | End: 2024-10-30
Payer: MEDICARE

## 2024-10-30 VITALS — HEART RATE: 60 BPM | OXYGEN SATURATION: 100 % | SYSTOLIC BLOOD PRESSURE: 121 MMHG | DIASTOLIC BLOOD PRESSURE: 82 MMHG

## 2024-10-30 DIAGNOSIS — C61 MALIGNANT NEOPLASM OF PROSTATE: ICD-10-CM

## 2024-10-30 DIAGNOSIS — R35.0 FREQUENCY OF MICTURITION: ICD-10-CM

## 2024-10-30 DIAGNOSIS — Z98.89 OTHER SPECIFIED POSTPROCEDURAL STATES: Chronic | ICD-10-CM

## 2024-10-30 PROCEDURE — G6002: CPT | Mod: 26

## 2024-10-30 PROCEDURE — 96402U: CUSTOM | Mod: NC

## 2024-10-30 PROCEDURE — 96402 CHEMO HORMON ANTINEOPL SQ/IM: CPT

## 2024-10-30 RX ORDER — LEUPROLIDE ACETATE 22.5 MG/.375ML
22.5 INJECTION, SUSPENSION, EXTENDED RELEASE SUBCUTANEOUS
Refills: 0 | Status: COMPLETED | OUTPATIENT
Start: 2024-10-30

## 2024-10-30 RX ADMIN — LEUPROLIDE ACETATE 0 MG: 22.5 INJECTION, SUSPENSION, EXTENDED RELEASE SUBCUTANEOUS at 00:00

## 2024-10-31 ENCOUNTER — NON-APPOINTMENT (OUTPATIENT)
Age: 69
End: 2024-10-31

## 2024-10-31 VITALS
WEIGHT: 207 LBS | RESPIRATION RATE: 16 BRPM | BODY MASS INDEX: 28.04 KG/M2 | OXYGEN SATURATION: 96 % | HEART RATE: 60 BPM | DIASTOLIC BLOOD PRESSURE: 83 MMHG | TEMPERATURE: 97.6 F | HEIGHT: 72 IN | SYSTOLIC BLOOD PRESSURE: 142 MMHG

## 2024-10-31 DIAGNOSIS — C61 MALIGNANT NEOPLASM OF PROSTATE: ICD-10-CM

## 2024-10-31 PROCEDURE — G6002: CPT | Mod: 26

## 2024-11-01 PROCEDURE — G6002: CPT | Mod: 26

## 2024-11-04 PROCEDURE — G6002: CPT | Mod: 26

## 2024-11-04 PROCEDURE — 77427 RADIATION TX MANAGEMENT X5: CPT

## 2024-11-05 PROCEDURE — G6002: CPT | Mod: 26

## 2024-11-06 PROCEDURE — G6002: CPT | Mod: 26

## 2024-11-07 ENCOUNTER — NON-APPOINTMENT (OUTPATIENT)
Age: 69
End: 2024-11-07

## 2024-11-07 VITALS
DIASTOLIC BLOOD PRESSURE: 97 MMHG | SYSTOLIC BLOOD PRESSURE: 145 MMHG | HEART RATE: 59 BPM | TEMPERATURE: 97.9 F | OXYGEN SATURATION: 97 % | BODY MASS INDEX: 27.9 KG/M2 | HEIGHT: 72 IN | WEIGHT: 206 LBS | RESPIRATION RATE: 16 BRPM

## 2024-11-07 PROCEDURE — G6002: CPT | Mod: 26

## 2024-11-08 ENCOUNTER — OUTPATIENT (OUTPATIENT)
Dept: OUTPATIENT SERVICES | Facility: HOSPITAL | Age: 69
LOS: 1 days | Discharge: HOME | End: 2024-11-08

## 2024-11-08 DIAGNOSIS — Z98.89 OTHER SPECIFIED POSTPROCEDURAL STATES: Chronic | ICD-10-CM

## 2024-11-08 DIAGNOSIS — D64.9 ANEMIA, UNSPECIFIED: ICD-10-CM

## 2024-11-08 PROCEDURE — G6002: CPT | Mod: 26

## 2024-11-11 PROCEDURE — G6002: CPT | Mod: 26

## 2024-11-11 PROCEDURE — 77427 RADIATION TX MANAGEMENT X5: CPT

## 2024-11-12 ENCOUNTER — NON-APPOINTMENT (OUTPATIENT)
Age: 69
End: 2024-11-12

## 2024-11-12 PROCEDURE — G6002: CPT | Mod: 26

## 2024-11-13 ENCOUNTER — RESULT REVIEW (OUTPATIENT)
Age: 69
End: 2024-11-13

## 2024-11-13 ENCOUNTER — NON-APPOINTMENT (OUTPATIENT)
Age: 69
End: 2024-11-13

## 2024-11-13 ENCOUNTER — APPOINTMENT (OUTPATIENT)
Dept: HEMATOLOGY ONCOLOGY | Facility: CLINIC | Age: 69
End: 2024-11-13
Payer: MEDICARE

## 2024-11-13 VITALS
DIASTOLIC BLOOD PRESSURE: 91 MMHG | OXYGEN SATURATION: 95 % | BODY MASS INDEX: 27.77 KG/M2 | SYSTOLIC BLOOD PRESSURE: 135 MMHG | HEIGHT: 72 IN | HEART RATE: 63 BPM | WEIGHT: 205 LBS

## 2024-11-13 LAB
APTT BLD: 37.5 SEC
BASOPHILS # BLD AUTO: 0.1 K/UL — SIGNIFICANT CHANGE UP (ref 0–0.2)
BASOPHILS NFR BLD AUTO: 1.2 % — SIGNIFICANT CHANGE UP (ref 0–2)
EOSINOPHIL # BLD AUTO: 0.3 K/UL — SIGNIFICANT CHANGE UP (ref 0–0.5)
EOSINOPHIL NFR BLD AUTO: 3.3 % — SIGNIFICANT CHANGE UP (ref 0–6)
HCT VFR BLD CALC: 38.5 % — LOW (ref 39–50)
HGB BLD-MCNC: 12.9 G/DL — LOW (ref 13–17)
INR PPP: 1.19 RATIO
LYMPHOCYTES # BLD AUTO: 0.9 K/UL — LOW (ref 1–3.3)
LYMPHOCYTES # BLD AUTO: 11.4 % — LOW (ref 13–44)
MCHC RBC-ENTMCNC: 31.8 PG — SIGNIFICANT CHANGE UP (ref 27–34)
MCHC RBC-ENTMCNC: 33.5 G/DL — SIGNIFICANT CHANGE UP (ref 32–36)
MCV RBC AUTO: 95 FL — SIGNIFICANT CHANGE UP (ref 80–100)
MONOCYTES # BLD AUTO: 0.8 K/UL — SIGNIFICANT CHANGE UP (ref 0–0.9)
MONOCYTES NFR BLD AUTO: 10 % — SIGNIFICANT CHANGE UP (ref 2–14)
NEUTROPHILS # BLD AUTO: 5.7 K/UL — SIGNIFICANT CHANGE UP (ref 1.8–7.4)
NEUTROPHILS NFR BLD AUTO: 74.2 % — SIGNIFICANT CHANGE UP (ref 43–77)
PLATELET # BLD AUTO: 180 K/UL — SIGNIFICANT CHANGE UP (ref 150–400)
PT BLD: 14.1 SEC
RBC # BLD: 4.05 M/UL — LOW (ref 4.2–5.8)
RBC # FLD: 14.1 % — SIGNIFICANT CHANGE UP (ref 10.3–14.5)
WBC # BLD: 7.7 K/UL — SIGNIFICANT CHANGE UP (ref 3.8–10.5)
WBC # FLD AUTO: 7.7 K/UL — SIGNIFICANT CHANGE UP (ref 3.8–10.5)

## 2024-11-13 PROCEDURE — 99205 OFFICE O/P NEW HI 60 MIN: CPT

## 2024-11-13 PROCEDURE — G6002: CPT | Mod: 26

## 2024-11-14 ENCOUNTER — NON-APPOINTMENT (OUTPATIENT)
Age: 69
End: 2024-11-14

## 2024-11-14 VITALS
RESPIRATION RATE: 16 BRPM | DIASTOLIC BLOOD PRESSURE: 88 MMHG | SYSTOLIC BLOOD PRESSURE: 145 MMHG | HEIGHT: 72 IN | TEMPERATURE: 97 F | OXYGEN SATURATION: 98 % | WEIGHT: 206 LBS | HEART RATE: 96 BPM | BODY MASS INDEX: 27.9 KG/M2

## 2024-11-14 LAB
ALBUMIN SERPL ELPH-MCNC: 4.2 G/DL
ALP BLD-CCNC: 76 U/L
ALT SERPL-CCNC: 20 U/L
ANION GAP SERPL CALC-SCNC: 12 MMOL/L
AST SERPL-CCNC: 20 U/L
BILIRUB SERPL-MCNC: 0.5 MG/DL
BUN SERPL-MCNC: 22 MG/DL
CALCIUM SERPL-MCNC: 9.1 MG/DL
CHLORIDE SERPL-SCNC: 108 MMOL/L
CO2 SERPL-SCNC: 24 MMOL/L
CREAT SERPL-MCNC: 1.38 MG/DL
EGFR: 55 ML/MIN/1.73M2
GLUCOSE SERPL-MCNC: 95 MG/DL
HAV IGM SER QL: NONREACTIVE
HBV CORE IGG+IGM SER QL: NONREACTIVE
HBV CORE IGM SER QL: NONREACTIVE
HBV SURFACE AB SER QL: NONREACTIVE
HBV SURFACE AG SER QL: NONREACTIVE
HCV AB SER QL: NONREACTIVE
HCV S/CO RATIO: 0.1 S/CO
MAGNESIUM SERPL-MCNC: 1.9 MG/DL
POTASSIUM SERPL-SCNC: 3.9 MMOL/L
PROT SERPL-MCNC: 6.4 G/DL
PSA SERPL-MCNC: 0.29 NG/ML
SODIUM SERPL-SCNC: 144 MMOL/L
TESTOST SERPL-MCNC: 3 NG/DL

## 2024-11-14 PROCEDURE — G6002: CPT | Mod: 26

## 2024-11-15 VITALS — WEIGHT: 205.91 LBS

## 2024-11-15 PROCEDURE — G6002: CPT | Mod: 26

## 2024-11-18 PROCEDURE — G6002: CPT | Mod: 26

## 2024-11-19 PROCEDURE — G6002: CPT | Mod: 26

## 2024-11-20 PROCEDURE — G6002: CPT | Mod: 26

## 2024-12-24 PROBLEM — F10.90 ALCOHOL USE: Status: ACTIVE | Noted: 2019-03-28

## 2024-12-26 ENCOUNTER — NON-APPOINTMENT (OUTPATIENT)
Age: 69
End: 2024-12-26

## 2024-12-30 ENCOUNTER — APPOINTMENT (OUTPATIENT)
Dept: RADIATION ONCOLOGY | Facility: CLINIC | Age: 69
End: 2024-12-30

## 2025-01-13 ENCOUNTER — APPOINTMENT (OUTPATIENT)
Dept: RADIATION ONCOLOGY | Facility: CLINIC | Age: 70
End: 2025-01-13
Payer: MEDICARE

## 2025-01-13 PROCEDURE — 99024 POSTOP FOLLOW-UP VISIT: CPT

## 2025-01-22 ENCOUNTER — OUTPATIENT (OUTPATIENT)
Dept: OUTPATIENT SERVICES | Facility: HOSPITAL | Age: 70
LOS: 1 days | Discharge: HOME | End: 2025-01-22

## 2025-01-22 DIAGNOSIS — Z98.89 OTHER SPECIFIED POSTPROCEDURAL STATES: Chronic | ICD-10-CM

## 2025-01-22 DIAGNOSIS — D64.9 ANEMIA, UNSPECIFIED: ICD-10-CM

## 2025-01-29 ENCOUNTER — APPOINTMENT (OUTPATIENT)
Age: 70
End: 2025-01-29

## 2025-01-30 DIAGNOSIS — C61 MALIGNANT NEOPLASM OF PROSTATE: ICD-10-CM

## 2025-02-04 ENCOUNTER — APPOINTMENT (OUTPATIENT)
Dept: RADIATION ONCOLOGY | Facility: CLINIC | Age: 70
End: 2025-02-04
Payer: MEDICARE

## 2025-02-04 ENCOUNTER — NON-APPOINTMENT (OUTPATIENT)
Age: 70
End: 2025-02-04

## 2025-02-04 ENCOUNTER — APPOINTMENT (OUTPATIENT)
Dept: HEMATOLOGY ONCOLOGY | Facility: CLINIC | Age: 70
End: 2025-02-04
Payer: MEDICARE

## 2025-02-04 VITALS
TEMPERATURE: 97.3 F | WEIGHT: 205.13 LBS | SYSTOLIC BLOOD PRESSURE: 121 MMHG | HEIGHT: 72 IN | DIASTOLIC BLOOD PRESSURE: 80 MMHG | HEART RATE: 57 BPM | OXYGEN SATURATION: 98 % | BODY MASS INDEX: 27.79 KG/M2

## 2025-02-04 DIAGNOSIS — C61 MALIGNANT NEOPLASM OF PROSTATE: ICD-10-CM

## 2025-02-04 PROCEDURE — G2211 COMPLEX E/M VISIT ADD ON: CPT

## 2025-02-04 PROCEDURE — 99214 OFFICE O/P EST MOD 30 MIN: CPT

## 2025-04-22 ENCOUNTER — OUTPATIENT (OUTPATIENT)
Dept: OUTPATIENT SERVICES | Facility: HOSPITAL | Age: 70
LOS: 1 days | Discharge: HOME | End: 2025-04-22

## 2025-04-22 DIAGNOSIS — C61 MALIGNANT NEOPLASM OF PROSTATE: ICD-10-CM

## 2025-04-22 DIAGNOSIS — Z98.89 OTHER SPECIFIED POSTPROCEDURAL STATES: Chronic | ICD-10-CM

## 2025-04-30 ENCOUNTER — APPOINTMENT (OUTPATIENT)
Dept: HEMATOLOGY ONCOLOGY | Facility: CLINIC | Age: 70
End: 2025-04-30
Payer: MEDICARE

## 2025-04-30 ENCOUNTER — RESULT REVIEW (OUTPATIENT)
Age: 70
End: 2025-04-30

## 2025-04-30 ENCOUNTER — APPOINTMENT (OUTPATIENT)
Age: 70
End: 2025-04-30

## 2025-04-30 ENCOUNTER — APPOINTMENT (OUTPATIENT)
Dept: HEMATOLOGY ONCOLOGY | Facility: CLINIC | Age: 70
End: 2025-04-30

## 2025-04-30 DIAGNOSIS — R97.20 ELEVATED PROSTATE, SPECIFIC ANTIGEN [PSA]: ICD-10-CM

## 2025-04-30 DIAGNOSIS — R53.83 OTHER FATIGUE: ICD-10-CM

## 2025-04-30 DIAGNOSIS — C61 MALIGNANT NEOPLASM OF PROSTATE: ICD-10-CM

## 2025-04-30 LAB
BASOPHILS # BLD AUTO: 0.03 K/UL — SIGNIFICANT CHANGE UP (ref 0–0.2)
BASOPHILS NFR BLD AUTO: 0.5 % — SIGNIFICANT CHANGE UP (ref 0–2)
EOSINOPHIL # BLD AUTO: 0.19 K/UL — SIGNIFICANT CHANGE UP (ref 0–0.5)
EOSINOPHIL NFR BLD AUTO: 3 % — SIGNIFICANT CHANGE UP (ref 0–6)
HCT VFR BLD CALC: 36.7 % — LOW (ref 39–50)
HGB BLD-MCNC: 12 G/DL — LOW (ref 13–17)
IMM GRANULOCYTES # BLD AUTO: 0.02 K/UL — SIGNIFICANT CHANGE UP (ref 0–0.07)
IMM GRANULOCYTES NFR BLD AUTO: 0.3 % — SIGNIFICANT CHANGE UP (ref 0–0.9)
LYMPHOCYTES # BLD AUTO: 1.2 K/UL — SIGNIFICANT CHANGE UP (ref 1–3.3)
LYMPHOCYTES NFR BLD AUTO: 18.9 % — SIGNIFICANT CHANGE UP (ref 13–44)
MCHC RBC-ENTMCNC: 29.9 PG — SIGNIFICANT CHANGE UP (ref 27–34)
MCHC RBC-ENTMCNC: 32.7 G/DL — SIGNIFICANT CHANGE UP (ref 32–36)
MCV RBC AUTO: 91.3 FL — SIGNIFICANT CHANGE UP (ref 80–100)
MONOCYTES # BLD AUTO: 0.53 K/UL — SIGNIFICANT CHANGE UP (ref 0–0.9)
MONOCYTES NFR BLD AUTO: 8.3 % — SIGNIFICANT CHANGE UP (ref 2–14)
NEUTROPHILS # BLD AUTO: 4.38 K/UL — SIGNIFICANT CHANGE UP (ref 1.8–7.4)
NEUTROPHILS NFR BLD AUTO: 69 % — SIGNIFICANT CHANGE UP (ref 43–77)
NRBC # BLD AUTO: 0 K/UL — SIGNIFICANT CHANGE UP (ref 0–0)
NRBC # FLD: 0 K/UL — SIGNIFICANT CHANGE UP (ref 0–0)
NRBC BLD AUTO-RTO: 0 /100 WBCS — SIGNIFICANT CHANGE UP (ref 0–0)
PLATELET # BLD AUTO: 184 K/UL — SIGNIFICANT CHANGE UP (ref 150–400)
PMV BLD: 10 FL — SIGNIFICANT CHANGE UP (ref 7–13)
RBC # BLD: 4.02 M/UL — LOW (ref 4.2–5.8)
RBC # FLD: 14.2 % — SIGNIFICANT CHANGE UP (ref 10.3–14.5)
WBC # BLD: 6.35 K/UL — SIGNIFICANT CHANGE UP (ref 3.8–10.5)
WBC # FLD AUTO: 6.35 K/UL — SIGNIFICANT CHANGE UP (ref 3.8–10.5)

## 2025-04-30 PROCEDURE — 99214 OFFICE O/P EST MOD 30 MIN: CPT

## 2025-05-01 LAB
ALBUMIN SERPL ELPH-MCNC: 4.3 G/DL
ALP BLD-CCNC: 89 U/L
ALT SERPL-CCNC: 29 U/L
ANION GAP SERPL CALC-SCNC: 12 MMOL/L
AST SERPL-CCNC: 24 U/L
BILIRUB SERPL-MCNC: 0.5 MG/DL
BUN SERPL-MCNC: 19 MG/DL
CALCIUM SERPL-MCNC: 9.4 MG/DL
CHLORIDE SERPL-SCNC: 105 MMOL/L
CO2 SERPL-SCNC: 25 MMOL/L
CREAT SERPL-MCNC: 1.24 MG/DL
EGFRCR SERPLBLD CKD-EPI 2021: 62 ML/MIN/1.73M2
GLUCOSE SERPL-MCNC: 154 MG/DL
MAGNESIUM SERPL-MCNC: 2 MG/DL
POTASSIUM SERPL-SCNC: 4.5 MMOL/L
PROT SERPL-MCNC: 6.4 G/DL
PSA SERPL-MCNC: 0.02 NG/ML
SODIUM SERPL-SCNC: 142 MMOL/L
TESTOST SERPL-MCNC: 5.6 NG/DL

## 2025-05-20 ENCOUNTER — NON-APPOINTMENT (OUTPATIENT)
Age: 70
End: 2025-05-20

## 2025-07-29 ENCOUNTER — APPOINTMENT (OUTPATIENT)
Age: 70
End: 2025-07-29

## 2025-07-29 ENCOUNTER — APPOINTMENT (OUTPATIENT)
Dept: HEMATOLOGY ONCOLOGY | Facility: CLINIC | Age: 70
End: 2025-07-29

## 2025-08-05 ENCOUNTER — APPOINTMENT (OUTPATIENT)
Dept: RADIATION ONCOLOGY | Facility: CLINIC | Age: 70
End: 2025-08-05
Payer: MEDICARE

## 2025-08-05 VITALS
HEART RATE: 69 BPM | HEIGHT: 72 IN | RESPIRATION RATE: 16 BRPM | SYSTOLIC BLOOD PRESSURE: 142 MMHG | OXYGEN SATURATION: 98 % | DIASTOLIC BLOOD PRESSURE: 86 MMHG | BODY MASS INDEX: 28.31 KG/M2 | WEIGHT: 209 LBS

## 2025-08-05 DIAGNOSIS — C61 MALIGNANT NEOPLASM OF PROSTATE: ICD-10-CM

## 2025-08-05 PROCEDURE — 99214 OFFICE O/P EST MOD 30 MIN: CPT

## 2025-08-05 PROCEDURE — G2211 COMPLEX E/M VISIT ADD ON: CPT
